# Patient Record
Sex: MALE | Race: BLACK OR AFRICAN AMERICAN | NOT HISPANIC OR LATINO | Employment: UNEMPLOYED | URBAN - METROPOLITAN AREA
[De-identification: names, ages, dates, MRNs, and addresses within clinical notes are randomized per-mention and may not be internally consistent; named-entity substitution may affect disease eponyms.]

---

## 2019-09-19 ENCOUNTER — HOSPITAL ENCOUNTER (INPATIENT)
Facility: OTHER | Age: 49
LOS: 5 days | Discharge: HOME | DRG: 194 | End: 2019-09-24
Attending: EMERGENCY MEDICINE | Admitting: EMERGENCY MEDICINE
Payer: MEDICAID

## 2019-09-19 DIAGNOSIS — R09.02 HYPOXIA: ICD-10-CM

## 2019-09-19 DIAGNOSIS — R00.0 TACHYCARDIA: ICD-10-CM

## 2019-09-19 DIAGNOSIS — J18.9 PNEUMONIA OF BOTH LOWER LOBES DUE TO INFECTIOUS ORGANISM: Primary | ICD-10-CM

## 2019-09-19 DIAGNOSIS — R06.02 SOB (SHORTNESS OF BREATH): ICD-10-CM

## 2019-09-19 DIAGNOSIS — R05.9 COUGH: ICD-10-CM

## 2019-09-19 DIAGNOSIS — D72.829 LEUKOCYTOSIS, UNSPECIFIED TYPE: ICD-10-CM

## 2019-09-19 PROBLEM — Z59.00 HOMELESSNESS: Status: ACTIVE | Noted: 2019-09-19

## 2019-09-19 PROBLEM — Z72.0 TOBACCO ABUSE: Status: ACTIVE | Noted: 2019-09-19

## 2019-09-19 PROBLEM — F19.10 POLYSUBSTANCE ABUSE: Chronic | Status: ACTIVE | Noted: 2019-09-19

## 2019-09-19 PROBLEM — F19.10 POLYSUBSTANCE ABUSE: Status: ACTIVE | Noted: 2019-09-19

## 2019-09-19 PROBLEM — D64.9 NORMOCYTIC ANEMIA: Status: ACTIVE | Noted: 2019-09-19

## 2019-09-19 PROBLEM — Z72.0 TOBACCO ABUSE: Chronic | Status: ACTIVE | Noted: 2019-09-19

## 2019-09-19 LAB
ALBUMIN SERPL BCP-MCNC: 3.7 G/DL (ref 3.5–5.2)
ALP SERPL-CCNC: 80 U/L (ref 55–135)
ALT SERPL W/O P-5'-P-CCNC: 25 U/L (ref 10–44)
AMPHET+METHAMPHET UR QL: NEGATIVE
ANION GAP SERPL CALC-SCNC: 10 MMOL/L (ref 8–16)
ANISOCYTOSIS BLD QL SMEAR: SLIGHT
AST SERPL-CCNC: 24 U/L (ref 10–40)
BARBITURATES UR QL SCN>200 NG/ML: NEGATIVE
BASOPHILS # BLD AUTO: ABNORMAL K/UL (ref 0–0.2)
BASOPHILS NFR BLD: 1 % (ref 0–1.9)
BENZODIAZ UR QL SCN>200 NG/ML: NEGATIVE
BILIRUB SERPL-MCNC: 0.5 MG/DL (ref 0.1–1)
BILIRUB UR QL STRIP: NEGATIVE
BNP SERPL-MCNC: <10 PG/ML (ref 0–99)
BUN SERPL-MCNC: 13 MG/DL (ref 6–20)
BZE UR QL SCN: NORMAL
CALCIUM SERPL-MCNC: 10.1 MG/DL (ref 8.7–10.5)
CANNABINOIDS UR QL SCN: NEGATIVE
CHLORIDE SERPL-SCNC: 101 MMOL/L (ref 95–110)
CLARITY UR: CLEAR
CO2 SERPL-SCNC: 29 MMOL/L (ref 23–29)
COLOR UR: YELLOW
CREAT SERPL-MCNC: 1 MG/DL (ref 0.5–1.4)
CREAT UR-MCNC: 39.4 MG/DL (ref 23–375)
DIFFERENTIAL METHOD: ABNORMAL
EOSINOPHIL # BLD AUTO: ABNORMAL K/UL (ref 0–0.5)
EOSINOPHIL NFR BLD: 0 % (ref 0–8)
ERYTHROCYTE [DISTWIDTH] IN BLOOD BY AUTOMATED COUNT: 15.3 % (ref 11.5–14.5)
EST. GFR  (AFRICAN AMERICAN): >60 ML/MIN/1.73 M^2
EST. GFR  (NON AFRICAN AMERICAN): >60 ML/MIN/1.73 M^2
ETHANOL UR-MCNC: <10 MG/DL
FERRITIN SERPL-MCNC: 397 NG/ML (ref 20–300)
FOLATE SERPL-MCNC: 8.3 NG/ML (ref 4–24)
GIANT PLATELETS BLD QL SMEAR: PRESENT
GLUCOSE SERPL-MCNC: 78 MG/DL (ref 70–110)
GLUCOSE UR QL STRIP: NEGATIVE
HCT VFR BLD AUTO: 37 % (ref 40–54)
HGB BLD-MCNC: 11.6 G/DL (ref 14–18)
HGB UR QL STRIP: NEGATIVE
IMM GRANULOCYTES # BLD AUTO: ABNORMAL K/UL (ref 0–0.04)
IMM GRANULOCYTES NFR BLD AUTO: ABNORMAL % (ref 0–0.5)
INFLUENZA A, MOLECULAR: NEGATIVE
INFLUENZA B, MOLECULAR: NEGATIVE
IRON SERPL-MCNC: <10 UG/DL (ref 45–160)
KETONES UR QL STRIP: NEGATIVE
LACTATE SERPL-SCNC: 1.1 MMOL/L (ref 0.5–2.2)
LACTATE SERPL-SCNC: 1.6 MMOL/L (ref 0.5–2.2)
LDH SERPL L TO P-CCNC: 746 U/L (ref 110–260)
LEUKOCYTE ESTERASE UR QL STRIP: NEGATIVE
LYMPHOCYTES # BLD AUTO: ABNORMAL K/UL (ref 1–4.8)
LYMPHOCYTES NFR BLD: 4 % (ref 18–48)
MCH RBC QN AUTO: 30.6 PG (ref 27–31)
MCHC RBC AUTO-ENTMCNC: 31.4 G/DL (ref 32–36)
MCV RBC AUTO: 98 FL (ref 82–98)
METAMYELOCYTES NFR BLD MANUAL: 4 %
METHADONE UR QL SCN>300 NG/ML: NEGATIVE
MONOCYTES # BLD AUTO: ABNORMAL K/UL (ref 0.3–1)
MONOCYTES NFR BLD: 3 % (ref 4–15)
MYELOCYTES NFR BLD MANUAL: 3 %
NEUTROPHILS # BLD AUTO: ABNORMAL K/UL (ref 1.8–7.7)
NEUTROPHILS NFR BLD: 83 % (ref 38–73)
NEUTS BAND NFR BLD MANUAL: 2 %
NITRITE UR QL STRIP: NEGATIVE
NRBC BLD-RTO: 3 /100 WBC
OPIATES UR QL SCN: NEGATIVE
PCP UR QL SCN>25 NG/ML: NEGATIVE
PH UR STRIP: 6 [PH] (ref 5–8)
PLATELET # BLD AUTO: 340 K/UL (ref 150–350)
PLATELET BLD QL SMEAR: ABNORMAL
PMV BLD AUTO: 11.8 FL (ref 9.2–12.9)
POCT GLUCOSE: 103 MG/DL (ref 70–110)
POIKILOCYTOSIS BLD QL SMEAR: SLIGHT
POLYCHROMASIA BLD QL SMEAR: ABNORMAL
POTASSIUM SERPL-SCNC: 3.5 MMOL/L (ref 3.5–5.1)
PROCALCITONIN SERPL IA-MCNC: 0.6 NG/ML
PROT SERPL-MCNC: 7 G/DL (ref 6–8.4)
PROT UR QL STRIP: NEGATIVE
RBC # BLD AUTO: 3.79 M/UL (ref 4.6–6.2)
RETICS/RBC NFR AUTO: 2.7 % (ref 0.4–2)
SATURATED IRON: ABNORMAL % (ref 20–50)
SMUDGE CELLS BLD QL SMEAR: PRESENT
SODIUM SERPL-SCNC: 140 MMOL/L (ref 136–145)
SP GR UR STRIP: <=1.005 (ref 1–1.03)
SPECIMEN SOURCE: NORMAL
TOTAL IRON BINDING CAPACITY: 240 UG/DL (ref 250–450)
TOXICOLOGY INFORMATION: NORMAL
TRANSFERRIN SERPL-MCNC: 162 MG/DL (ref 200–375)
TROPONIN I SERPL DL<=0.01 NG/ML-MCNC: 0.02 NG/ML (ref 0–0.03)
URN SPEC COLLECT METH UR: ABNORMAL
UROBILINOGEN UR STRIP-ACNC: NEGATIVE EU/DL
VIT B12 SERPL-MCNC: >2000 PG/ML (ref 210–950)
WBC # BLD AUTO: 70.49 K/UL (ref 3.9–12.7)
WBC TOXIC VACUOLES BLD QL SMEAR: PRESENT

## 2019-09-19 PROCEDURE — 99223 PR INITIAL HOSPITAL CARE,LEVL III: ICD-10-PCS | Mod: ,,, | Performed by: INTERNAL MEDICINE

## 2019-09-19 PROCEDURE — 85007 BL SMEAR W/DIFF WBC COUNT: CPT

## 2019-09-19 PROCEDURE — 82746 ASSAY OF FOLIC ACID SERUM: CPT

## 2019-09-19 PROCEDURE — 84484 ASSAY OF TROPONIN QUANT: CPT

## 2019-09-19 PROCEDURE — 86480 TB TEST CELL IMMUN MEASURE: CPT

## 2019-09-19 PROCEDURE — 85060 PATHOLOGIST REVIEW: ICD-10-PCS | Mod: ,,, | Performed by: PATHOLOGY

## 2019-09-19 PROCEDURE — 85045 AUTOMATED RETICULOCYTE COUNT: CPT

## 2019-09-19 PROCEDURE — 87206 SMEAR FLUORESCENT/ACID STAI: CPT

## 2019-09-19 PROCEDURE — 83605 ASSAY OF LACTIC ACID: CPT

## 2019-09-19 PROCEDURE — 83615 LACTATE (LD) (LDH) ENZYME: CPT

## 2019-09-19 PROCEDURE — 83540 ASSAY OF IRON: CPT

## 2019-09-19 PROCEDURE — 84145 PROCALCITONIN (PCT): CPT

## 2019-09-19 PROCEDURE — 85060 BLOOD SMEAR INTERPRETATION: CPT | Mod: ,,, | Performed by: PATHOLOGY

## 2019-09-19 PROCEDURE — 94761 N-INVAS EAR/PLS OXIMETRY MLT: CPT

## 2019-09-19 PROCEDURE — 93010 EKG 12-LEAD: ICD-10-PCS | Mod: 76,,, | Performed by: INTERNAL MEDICINE

## 2019-09-19 PROCEDURE — 87502 INFLUENZA DNA AMP PROBE: CPT

## 2019-09-19 PROCEDURE — 87015 SPECIMEN INFECT AGNT CONCNTJ: CPT

## 2019-09-19 PROCEDURE — 94640 AIRWAY INHALATION TREATMENT: CPT

## 2019-09-19 PROCEDURE — 80053 COMPREHEN METABOLIC PANEL: CPT

## 2019-09-19 PROCEDURE — 63600175 PHARM REV CODE 636 W HCPCS: Performed by: INTERNAL MEDICINE

## 2019-09-19 PROCEDURE — 96365 THER/PROPH/DIAG IV INF INIT: CPT

## 2019-09-19 PROCEDURE — 81003 URINALYSIS AUTO W/O SCOPE: CPT

## 2019-09-19 PROCEDURE — 11000001 HC ACUTE MED/SURG PRIVATE ROOM

## 2019-09-19 PROCEDURE — 82728 ASSAY OF FERRITIN: CPT

## 2019-09-19 PROCEDURE — 25000003 PHARM REV CODE 250: Performed by: INTERNAL MEDICINE

## 2019-09-19 PROCEDURE — 87116 MYCOBACTERIA CULTURE: CPT

## 2019-09-19 PROCEDURE — 80307 DRUG TEST PRSMV CHEM ANLYZR: CPT

## 2019-09-19 PROCEDURE — 99285 EMERGENCY DEPT VISIT HI MDM: CPT | Mod: 25

## 2019-09-19 PROCEDURE — 63600175 PHARM REV CODE 636 W HCPCS: Performed by: EMERGENCY MEDICINE

## 2019-09-19 PROCEDURE — 82607 VITAMIN B-12: CPT

## 2019-09-19 PROCEDURE — 93005 ELECTROCARDIOGRAM TRACING: CPT

## 2019-09-19 PROCEDURE — 96366 THER/PROPH/DIAG IV INF ADDON: CPT

## 2019-09-19 PROCEDURE — 86703 HIV-1/HIV-2 1 RESULT ANTBDY: CPT

## 2019-09-19 PROCEDURE — 87040 BLOOD CULTURE FOR BACTERIA: CPT

## 2019-09-19 PROCEDURE — 85027 COMPLETE CBC AUTOMATED: CPT

## 2019-09-19 PROCEDURE — 25000003 PHARM REV CODE 250: Performed by: EMERGENCY MEDICINE

## 2019-09-19 PROCEDURE — 36415 COLL VENOUS BLD VENIPUNCTURE: CPT

## 2019-09-19 PROCEDURE — 96367 TX/PROPH/DG ADDL SEQ IV INF: CPT

## 2019-09-19 PROCEDURE — 83880 ASSAY OF NATRIURETIC PEPTIDE: CPT

## 2019-09-19 PROCEDURE — 99223 1ST HOSP IP/OBS HIGH 75: CPT | Mod: ,,, | Performed by: INTERNAL MEDICINE

## 2019-09-19 PROCEDURE — 25000242 PHARM REV CODE 250 ALT 637 W/ HCPCS: Performed by: EMERGENCY MEDICINE

## 2019-09-19 PROCEDURE — 93010 ELECTROCARDIOGRAM REPORT: CPT | Mod: 76,,, | Performed by: INTERNAL MEDICINE

## 2019-09-19 RX ORDER — ALBUTEROL SULFATE 0.83 MG/ML
2.5 SOLUTION RESPIRATORY (INHALATION)
Status: COMPLETED | OUTPATIENT
Start: 2019-09-19 | End: 2019-09-19

## 2019-09-19 RX ORDER — ACETAMINOPHEN 500 MG
1000 TABLET ORAL
Status: COMPLETED | OUTPATIENT
Start: 2019-09-19 | End: 2019-09-19

## 2019-09-19 RX ORDER — SODIUM CHLORIDE 0.9 % (FLUSH) 0.9 %
10 SYRINGE (ML) INJECTION
Status: CANCELLED | OUTPATIENT
Start: 2019-09-19

## 2019-09-19 RX ORDER — ENOXAPARIN SODIUM 100 MG/ML
40 INJECTION SUBCUTANEOUS EVERY 24 HOURS
Status: DISCONTINUED | OUTPATIENT
Start: 2019-09-19 | End: 2019-09-24 | Stop reason: HOSPADM

## 2019-09-19 RX ORDER — SODIUM CHLORIDE 0.9 % (FLUSH) 0.9 %
5 SYRINGE (ML) INJECTION
Status: DISCONTINUED | OUTPATIENT
Start: 2019-09-19 | End: 2019-09-24 | Stop reason: HOSPADM

## 2019-09-19 RX ORDER — ACETAMINOPHEN 325 MG/1
650 TABLET ORAL EVERY 6 HOURS PRN
Status: DISCONTINUED | OUTPATIENT
Start: 2019-09-19 | End: 2019-09-24 | Stop reason: HOSPADM

## 2019-09-19 RX ADMIN — SODIUM CHLORIDE 1974 ML: 0.9 INJECTION, SOLUTION INTRAVENOUS at 01:09

## 2019-09-19 RX ADMIN — AZITHROMYCIN MONOHYDRATE 500 MG: 500 INJECTION, POWDER, LYOPHILIZED, FOR SOLUTION INTRAVENOUS at 01:09

## 2019-09-19 RX ADMIN — ALBUTEROL SULFATE 2.5 MG: 2.5 SOLUTION RESPIRATORY (INHALATION) at 10:09

## 2019-09-19 RX ADMIN — CEFTRIAXONE 1 G: 1 INJECTION, SOLUTION INTRAVENOUS at 01:09

## 2019-09-19 RX ADMIN — ENOXAPARIN SODIUM 40 MG: 100 INJECTION SUBCUTANEOUS at 06:09

## 2019-09-19 RX ADMIN — ACETAMINOPHEN 1000 MG: 500 TABLET ORAL at 10:09

## 2019-09-19 RX ADMIN — ACETAMINOPHEN 650 MG: 325 TABLET, FILM COATED ORAL at 06:09

## 2019-09-19 NOTE — H&P
"[facility] Medicine  History & Physical    Patient Name: Gustabo Callejas  MRN: [id number]  Admission Date: 9/19/2019  Attending Physician: SOY Ma MD  Primary Care Provider: Primary Doctor No     Patient information was obtained from patient, past medical records and ER records.     Subjective:     Principal Problem:Pneumonia of both lower lobes due to infectious organism    Chief Complaint:   Chief Complaint   Patient presents with    Fever     pt came from [facility] with c/o fever and cough, reported temp of 103.         HPI: Mr. Callejas is a 49/M with pertinent history of homelessness, polysubstance abuse who presented to ED 09/19 with a several day history of fatigue, fever, chills, and shortness of breath. He reports that he has been feeling intermittently warm but has not measured his temperature; has also noted feeling "wheezy" and "like I'm not breathing properly." He does not recall specific night sweats other than a recent night spent at the [facility], where he had night sweats, but felt that the temperature was just elevated in the building. He states that he was attempting to be admitted to [facility] to enroll in rehabilitation but had significant fever prior and subsequently presented to the hospital.    He denies a personal history of tuberculosis or HIV, but does report long-term homelessness and prior imprisonments. He reported that he had been given a TB skin test and was due to be read Wednesday, but had not been officially read. Past sexual partners include men and women, but with only one partner in the last six months. He is unsure of any recent weight loss. He reports smoking 8-10 cigarettes a day "when I can get them," marijuana "when I can get it," and cocaine "as often as I can, most days." He states he drinks, but irregularly, with one to two beers per occasion.    History reviewed. No pertinent past medical history.    Past Surgical " History:   Procedure Laterality Date    APPENDECTOMY       Review of patient's allergies indicates:   Allergen Reactions    Asa [aspirin] Swelling     No current facility-administered medications on file prior to encounter.      No current outpatient medications on file prior to encounter.     Family History     Problem Relation (Age of Onset)    Breast cancer Mother    Cancer Sister        Tobacco Use    Smoking status: Current Some Day Smoker     Packs/day: 0.50   Substance and Sexual Activity    Alcohol use: Yes     Drinks per session: 1 or 2    Drug use: Yes     Types: Cocaine, Marijuana    Sexual activity: Yes     Partners: Female, Male     Review of Systems   Constitutional: Positive for chills, diaphoresis, fatigue and fever.   HENT: Negative for sore throat and trouble swallowing.    Eyes: Negative for pain and visual disturbance.   Respiratory: Positive for cough and shortness of breath.    Cardiovascular: Negative for chest pain and palpitations.   Gastrointestinal: Negative for abdominal pain, nausea and vomiting.   Genitourinary: Negative for difficulty urinating and dysuria.   Musculoskeletal: Negative for arthralgias and joint swelling.   Skin: Negative for rash and wound.   Neurological: Negative for weakness and numbness.     Objective:     Vital Signs (Most Recent):  Temp: (!) 100.6 °F (38.1 °C) (09/19/19 1325)  Pulse: 92 (09/19/19 1406)  Resp: 19 (09/19/19 1403)  BP: 103/63 (09/19/19 1406)  SpO2: 95 % (09/19/19 1406) Vital Signs (24h Range):  Temp:  [100.4 °F (38 °C)-101.6 °F (38.7 °C)] 100.6 °F (38.1 °C)  Pulse:  [] 92  Resp:  [18-22] 19  SpO2:  [92 %-98 %] 95 %  BP: (103-114)/(55-63) 103/63     Weight: 65.8 kg (145 lb)  Body mass index is 23.4 kg/m².    Physical Exam   Constitutional: He is oriented to person, place, and time. He appears well-developed. No distress.   HENT:   Head: Normocephalic and atraumatic.   Eyes: Conjunctivae and EOM are normal.   Cardiovascular: Normal rate and  intact distal pulses.   Pulmonary/Chest: Effort normal. No respiratory distress.   Bibasilar crackles.   Abdominal: Soft. There is no tenderness.   Musculoskeletal: Normal range of motion. He exhibits no edema.   Neurological: He is alert and oriented to person, place, and time.   Skin: Skin is warm and dry.   Nursing note and vitals reviewed.     Significant Labs:   CBC:  Recent Labs   Lab 09/19/19  1019   WBC 70.49*   HGB 11.6*   HCT 37.0*      GRAN 83.0*  Test Not Performed   LYMPH 4.0*  Test Not Performed   MONO 3.0*  Test Not Performed   EOS Test Not Performed   BASO Test Not Performed      CMP:  Recent Labs   Lab 09/19/19  1019      K 3.5      CO2 29   BUN 13   CREATININE 1.0   GLU 78   CALCIUM 10.1   ALKPHOS 80   AST 24   ALT 25   BILITOT 0.5   PROT 7.0   ALBUMIN 3.7   ANIONGAP 10     No results for input(s): PROCAL, LACTATE in the last 168 hours.     Significant Imaging:   CXR 09/19/19:  There is focal airspace opacification in the superior segment of the right lower lobe as well as the left lower lobe. The cardiomediastinal silhouette is normal. The osseous and soft tissue structures are normal.    Assessment/Plan:     * Pneumonia of both lower lobes due to infectious organism  - Presumed infectious etiology of bilateral lower lobe abnormalities noted on CXR and greatly elevated WBCs. Differentials: viral, bacterial, risk factors for TB, HIV as well, polysubstance abuse with cocaine, marijuana, tobacco (all smoked).  - WBCs to 70s without prior labs for comparison; diff with mostly neutrophils, but some bands, myelocytes and metamyelocytes as well.  - Attests to recent TB skin test but never read. Check TB quantiferon gold, AFB respiratory culture x 3.  - Continue coverage for CAP started in ED: ceftriaxone 1g IV q24hr, azithromycin 500mg IV q24hr.  - Lactic 1.6; procal pending. Check HIV 1/2.  - Consult pulmonology for further evaluation and management recommendations.    Normocytic  anemia  - Normocytic anemia with no prior labs for comparison.  - Check B12, folate, iron studies, retic count.    Homelessness  - Social work consult for discharge planning.    Polysubstance abuse  - Polysubstance abuse with tobacco, marijuana, and cocaine.  - Cessation counseling; is interested in cessation and was attempting to enter [facility] for detox.    Tobacco abuse  - Cessation counseling.  - Declines nicotine patch.    VTE Risk Mitigation (From admission, onward)    None             SOY Ma MD  Department of Hospital Medicine   [facility]

## 2019-09-19 NOTE — ED NOTES
Appearance: Pt awake, alert & oriented to person, place & time. Pt in no acute distress at present time. Pt is clean and well groomed with clothes appropriately fastened. + reports of HA.   Skin: Skin warm, dry & intact. Color consistent with ethnicity. Mucous membranes dry. No breakdown or brusing noted.   Musculoskeletal: Patient moving all extremities well, no obvious swelling or deformities noted.   Respiratory: Respirations spontaneous, even, and non-labored. Visible chest rise noted. Airway is open and patent. No accessory muscle use noted. Frequent coughing noted.  Neurologic: Sensation is intact. Speech is clear and appropriate. Eyes open spontaneously, behavior appropriate to situation, follows commands, facial expression symmetrical, bilateral hand grasp equal and even, purposeful motor response noted.  Cardiac: All peripheral pulses present. No Bilateral lower extremity edema. Cap refill is <3 seconds. Pt denies active chest pains, dizziness, blurred vision, weakness or fatigue at this time. Mild SOB reported although RR even and unlabored.   Abdomen:  Pt denies active abd pains, cramping or discomfort, No N/V/D at this time.   : Pt reports no dysuria or hematuria.

## 2019-09-19 NOTE — PLAN OF CARE
Problem: Infection  Goal: Infection Symptom Resolution  Outcome: Ongoing (interventions implemented as appropriate)  Patient arrived via stretcher no acute distress,with mask in place.sera AC IV s/l .Airborne isolation started.denies pain non productive cough noted .will cont to assess.

## 2019-09-19 NOTE — ED NOTES
Airborne precautions initiated. Placed sx mask on pt at this time. Pt AAOx4 and appropriate at this time. Respirations even and unlabored. No acute distress noted.

## 2019-09-19 NOTE — ASSESSMENT & PLAN NOTE
- Presumed infectious etiology of bilateral lower lobe abnormalities noted on CXR and greatly elevated WBCs. Differentials: viral, bacterial, risk factors for TB, HIV as well, polysubstance abuse with cocaine, marijuana, tobacco (all smoked).  - WBCs to 70s without prior labs for comparison; diff with mostly neutrophils, but some bands, myelocytes and metamyelocytes as well.  - TB quantiferon gold pending, AFB respiratory culture x 3 being collected.  - Continue coverage for CAP with ceftriaxone 1g IV q24hr, azithromycin 500mg IV q24hr.  - Lactic 1.6; procal 0.6. HIV 1/2 pending.  - Appreciate pulmonology assistance.

## 2019-09-19 NOTE — ED NOTES
"Pt to ED via EMS, c/o fever, chills, states "eddie been shaking." Admits to smoking cocaine yesterday; was at [facility] for drug detox they told him he had a fever and called EMS. Pt c/o dry cough. Denies CP, nausea or vomiting at this time. + reports of SOB pt with no WOB. Pt AAOx4 and appropriate at this time. Respirations even and unlabored. No acute distress noted. Pt updated on POC. Bed is locked and in lowest position with side rails up x2. Call bell within reach and pt oriented to use of call bell. Pt on continuous cardiac monitoring, continuous pulse ox, and continuous BP cuff. Will continue to monitor.     "

## 2019-09-19 NOTE — ASSESSMENT & PLAN NOTE
- Normocytic anemia with no prior labs for comparison.  - Check B12, folate, iron studies, retic count.

## 2019-09-19 NOTE — ASSESSMENT & PLAN NOTE
- Polysubstance abuse with tobacco, marijuana, and cocaine.  - Cessation counseling; is interested in cessation and was attempting to enter [facility] for detox.

## 2019-09-19 NOTE — HPI
"Mr. Callejas is a 49/M with pertinent history of homelessness, polysubstance abuse who presented to ED 09/19 with a several day history of fatigue, fever, chills, and shortness of breath. He reports that he has been feeling intermittently warm but has not measured his temperature; has also noted feeling "wheezy" and "like I'm not breathing properly." He does not recall specific night sweats other than a recent night spent at the [facility], where he had night sweats, but felt that the temperature was just elevated in the building. He states that he was attempting to be admitted to [facility] to enroll in rehabilitation but had significant fever prior and subsequently presented to the hospital.    He denies a personal history of tuberculosis or HIV, but does report long-term homelessness and prior imprisonments. He reported that he had been given a TB skin test and was due to be read Wednesday, but had not been officially read. Past sexual partners include men and women, but with only one partner in the last six months. He is unsure of any recent weight loss. He reports smoking 8-10 cigarettes a day "when I can get them," marijuana "when I can get it," and cocaine "as often as I can, most days." He states he drinks, but irregularly, with one to two beers per occasion.  "

## 2019-09-19 NOTE — ED PROVIDER NOTES
Encounter Date: 9/19/2019    SCRIBE #1 NOTE: I, Abbi Thompson, am scribing for, and in the presence of, Dr. Davalos.       History     Chief Complaint   Patient presents with    Fever     pt came from [facility] with c/o fever and cough, reported temp of 103.      Time seen by provider: 10:08 AM    This is a 49 y.o. male who presents with complaint of fever and chills that began yesterday. Patient was at [facility] for drug detox with a reported temperature of 103. He reports dry cough, but denies nausea, vomiting, SOB, chest pain, headache, dizziness, light headedness, or weakness. Patient is homeless, and is unsure of sick contacts. He denies past medical history. He admits to use of cocaine, tobacco, and alcohol.    The history is provided by the patient.     Review of patient's allergies indicates:   Allergen Reactions    Asa [aspirin] Swelling     History reviewed. No pertinent past medical history.  Past Surgical History:   Procedure Laterality Date    APPENDECTOMY       Family History   Problem Relation Age of Onset    Breast cancer Mother     Cancer Sister      Social History     Tobacco Use    Smoking status: Current Some Day Smoker     Packs/day: 0.50   Substance Use Topics    Alcohol use: Yes     Drinks per session: 1 or 2    Drug use: Yes     Types: Cocaine, Marijuana     Review of Systems   Constitutional: Positive for chills and fever.   HENT: Negative for congestion and sore throat.    Eyes: Negative for photophobia and redness.   Respiratory: Positive for cough. Negative for shortness of breath.    Cardiovascular: Negative for chest pain.   Gastrointestinal: Negative for abdominal pain, nausea and vomiting.   Genitourinary: Negative for dysuria.   Musculoskeletal: Negative for back pain.   Skin: Negative for rash.   Neurological: Negative for dizziness, weakness, light-headedness and headaches.   Psychiatric/Behavioral: Negative for confusion.       Physical Exam     Initial Vitals  [09/19/19 0922]   BP Pulse Resp Temp SpO2   (!) 109/57 100 20 (!) 100.4 °F (38 °C) (!) 92 %      MAP       --         Physical Exam    Nursing note and vitals reviewed.  Constitutional: He appears well-developed and well-nourished. He is not diaphoretic. No distress.   Temperature of 102 on exam.   HENT:   Head: Normocephalic and atraumatic.   Mouth/Throat: Oropharynx is clear and moist.   Eyes: Conjunctivae and EOM are normal. Pupils are equal, round, and reactive to light.   Neck: Normal range of motion. Neck supple.   Cardiovascular: Normal rate, regular rhythm, S1 normal, S2 normal and normal heart sounds. Exam reveals no gallop and no friction rub.    No murmur heard.  Pulmonary/Chest: Breath sounds normal. No respiratory distress. He has no wheezes. He has no rhonchi. He has no rales.   Abdominal: Soft. Bowel sounds are normal. There is no tenderness. There is no rebound and no guarding.   Musculoskeletal: Normal range of motion. He exhibits no edema or tenderness.   No lower extremity edema.    Lymphadenopathy:     He has no cervical adenopathy.   Neurological: He is alert and oriented to person, place, and time.   Skin: Skin is warm and dry. Capillary refill takes less than 2 seconds. No rash noted. No pallor.   Psychiatric: He has a normal mood and affect. His behavior is normal. Judgment and thought content normal.         ED Course   Procedures  Labs Reviewed   CBC W/ AUTO DIFFERENTIAL - Abnormal; Notable for the following components:       Result Value    WBC 70.49 (*)     RBC 3.79 (*)     Hemoglobin 11.6 (*)     Hematocrit 37.0 (*)     Mean Corpuscular Hemoglobin Conc 31.4 (*)     RDW 15.3 (*)     nRBC 3 (*)     Gran% 83.0 (*)     Lymph% 4.0 (*)     Mono% 3.0 (*)     All other components within normal limits    Narrative:       WBCIR critical result(s) called and verbal readback obtained from   Sergey Turner RN @ED 11:15, 09/19/2019 12:01   URINALYSIS, REFLEX TO URINE CULTURE - Abnormal; Notable for the  following components:    Specific Gravity, UA <=1.005 (*)     All other components within normal limits    Narrative:     Preferred Collection Type->Urine, Clean Catch   PROCALCITONIN - Abnormal; Notable for the following components:    Procalcitonin 0.60 (*)     All other components within normal limits   RETICULOCYTES - Abnormal; Notable for the following components:    Retic 2.7 (*)     All other components within normal limits   LACTATE DEHYDROGENASE - Abnormal; Notable for the following components:     (*)     All other components within normal limits   INFLUENZA A & B BY MOLECULAR   COMPREHENSIVE METABOLIC PANEL   B-TYPE NATRIURETIC PEPTIDE   TROPONIN I   LACTIC ACID, PLASMA   TOXICOLOGY SCREEN, URINE, RANDOM (COMPLIANCE)    Narrative:     Preferred Collection Type->Urine, Clean Catch   QUANTIFERON GOLD TB     EKG Readings: (Independently Interpreted)   Initial Reading: No STEMI.   Normal sinus rhythm. No ischemic changes. Ventricular rate of 91 bpm. No acute ST/T wave changes. No T wave inversions.     ECG Results          EKG 12-lead (Final result)  Result time 09/20/19 15:44:39    Final result by Interface, Lab In [facility] (09/20/19 15:44:39)                 Narrative:    Test Reason : R00.0,    Vent. Rate : 091 BPM     Atrial Rate : 091 BPM     P-R Int : 168 ms          QRS Dur : 076 ms      QT Int : 352 ms       P-R-T Axes : 067 050 069 degrees     QTc Int : 432 ms    Normal sinus rhythm  Normal ECG    Confirmed by Kvng aMtt MD ([id number]) on 9/20/2019 3:44:29 PM    Referred By: [email or web address]   SELF           Confirmed By:Kvng Matt MD                             EKG 12-lead (Final result)  Result time 09/20/19 15:39:03    Final result by Interface, Lab In [facility] (09/20/19 15:39:03)                 Narrative:    Test Reason : R06.02,    Vent. Rate : 091 BPM     Atrial Rate : 091 BPM     P-R Int : 158 ms          QRS Dur : 080 ms      QT Int : 334 ms       P-R-T Axes : 078 067 076 degrees      QTc Int : 410 ms    Normal sinus rhythm  Normal ECG    Confirmed by Kvng Matt MD ([id number]) on 9/20/2019 3:38:56 PM    Referred By: [email or web address]   SELF           Confirmed By:Kvng Matt MD                             EKG 12-LEAD (Final result)  Result time 09/20/19 15:27:28    Final result by Unknown User (09/20/19 15:27:28)                                Imaging Results          X-Ray Chest PA And Lateral (Final result)  Result time 09/19/19 11:21:27    Final result by Indira Garcia MD (09/19/19 11:21:27)                 Impression:      Findings concerning for bilateral lower lobe pneumonia.  Follow-up radiographs are recommended in 6-8 weeks to document resolution.      Electronically signed by: Indira Garcia MD  Date:    09/19/2019  Time:    11:21             Narrative:    EXAMINATION:  XR CHEST PA AND LATERAL    CLINICAL HISTORY:  Cough    TECHNIQUE:  PA and lateral views of the chest were performed.    COMPARISON:  None    FINDINGS:  There is focal airspace opacification in the superior segment of the right lower lobe as well as the left lower lobe.    The cardiomediastinal silhouette is normal.    The osseous and soft tissue structures are normal.                              X-Rays:   Independently Interpreted Readings:   Chest X-Ray: Bilateral lower lobe pneumonia.     Medical Decision Making:   History:   Old Medical Records: I decided to obtain old medical records.  Independently Interpreted Test(s):   I have ordered and independently interpreted X-rays - see prior notes.  I have ordered and independently interpreted EKG Reading(s) - see prior notes  Clinical Tests:   Lab Tests: Ordered and Reviewed  Radiological Study: Ordered and Reviewed  Medical Tests: Ordered and Reviewed            Scribe Attestation:   Scribe #1: I performed the above scribed service and the documentation accurately describes the services I performed. I attest to the accuracy of the note.    Attending  Attestation:           Physician Attestation for Scribe:  Physician Attestation Statement for Scribe #1: I, Dr. Mendoza, reviewed documentation, as scribed by Abbi Thompson in my presence, and it is both accurate and complete.         Attending ED Notes:   50 y/o male with fever, chills and dry cough. Blood cultures are drawn, IV antibiotics are administered. IV fluids are administered. Lactic acid is 1.6. CXR reveals bilateral pneumonia. No elevation in BNP. The patient is extensively counseled on there diagnosis and treatment including all diagnostic, laboratory and physical exam findings. The patient is admitted in stable condition.            ED Course as of Sep 22 1120   Thu Sep 19, 2019   1305 Consulted and discussed with Dr. Goodwin, who will admit the patient to Dr. Ma.      [LT]      ED Course User Index  [LT] Abbi Thompson     Clinical Impression:     1. Pneumonia of both lower lobes due to infectious organism    2. SOB (shortness of breath)    3. Cough    4. Hypoxia    5. Tachycardia                                 Gene Mendoza MD  09/22/19 1130

## 2019-09-19 NOTE — ASSESSMENT & PLAN NOTE
- Presumed infectious etiology of bilateral lower lobe abnormalities noted on CXR and greatly elevated WBCs. Differentials: viral, bacterial, risk factors for TB, HIV as well, polysubstance abuse with cocaine, marijuana, tobacco (all smoked).  - WBCs to 70s without prior labs for comparison; diff with mostly neutrophils, but some bands, myelocytes and metamyelocytes as well.  - Attests to recent TB skin test but never read. Check TB quantiferon gold, AFB respiratory culture x 3.  - Continue coverage for CAP started in ED: ceftriaxone 1g IV q24hr, azithromycin 500mg IV q24hr.  - Lactic 1.6; procal pending.  - Consult pulmonology for further evaluation and management recommendations.

## 2019-09-19 NOTE — SUBJECTIVE & OBJECTIVE
History reviewed. No pertinent past medical history.    Past Surgical History:   Procedure Laterality Date    APPENDECTOMY       Review of patient's allergies indicates:   Allergen Reactions    Asa [aspirin] Swelling     No current facility-administered medications on file prior to encounter.      No current outpatient medications on file prior to encounter.     Family History     Problem Relation (Age of Onset)    Breast cancer Mother    Cancer Sister        Tobacco Use    Smoking status: Current Some Day Smoker     Packs/day: 0.50   Substance and Sexual Activity    Alcohol use: Yes     Drinks per session: 1 or 2    Drug use: Yes     Types: Cocaine, Marijuana    Sexual activity: Yes     Partners: Female, Male     Review of Systems   Constitutional: Positive for chills, diaphoresis, fatigue and fever.   HENT: Negative for sore throat and trouble swallowing.    Eyes: Negative for pain and visual disturbance.   Respiratory: Positive for cough and shortness of breath.    Cardiovascular: Negative for chest pain and palpitations.   Gastrointestinal: Negative for abdominal pain, nausea and vomiting.   Genitourinary: Negative for difficulty urinating and dysuria.   Musculoskeletal: Negative for arthralgias and joint swelling.   Skin: Negative for rash and wound.   Neurological: Negative for weakness and numbness.     Objective:     Vital Signs (Most Recent):  Temp: (!) 100.6 °F (38.1 °C) (09/19/19 1325)  Pulse: 92 (09/19/19 1406)  Resp: 19 (09/19/19 1403)  BP: 103/63 (09/19/19 1406)  SpO2: 95 % (09/19/19 1406) Vital Signs (24h Range):  Temp:  [100.4 °F (38 °C)-101.6 °F (38.7 °C)] 100.6 °F (38.1 °C)  Pulse:  [] 92  Resp:  [18-22] 19  SpO2:  [92 %-98 %] 95 %  BP: (103-114)/(55-63) 103/63     Weight: 65.8 kg (145 lb)  Body mass index is 23.4 kg/m².    Physical Exam   Constitutional: He is oriented to person, place, and time. He appears well-developed. No distress.   HENT:   Head: Normocephalic and atraumatic.    Eyes: Conjunctivae and EOM are normal.   Cardiovascular: Normal rate and intact distal pulses.   Pulmonary/Chest: Effort normal. No respiratory distress.   Bibasilar crackles.   Abdominal: Soft. There is no tenderness.   Musculoskeletal: Normal range of motion. He exhibits no edema.   Neurological: He is alert and oriented to person, place, and time.   Skin: Skin is warm and dry.   Nursing note and vitals reviewed.     Significant Labs:   CBC:  Recent Labs   Lab 09/19/19  1019   WBC 70.49*   HGB 11.6*   HCT 37.0*      GRAN 83.0*  Test Not Performed   LYMPH 4.0*  Test Not Performed   MONO 3.0*  Test Not Performed   EOS Test Not Performed   BASO Test Not Performed      CMP:  Recent Labs   Lab 09/19/19  1019      K 3.5      CO2 29   BUN 13   CREATININE 1.0   GLU 78   CALCIUM 10.1   ALKPHOS 80   AST 24   ALT 25   BILITOT 0.5   PROT 7.0   ALBUMIN 3.7   ANIONGAP 10     No results for input(s): PROCAL, LACTATE in the last 168 hours.     Significant Imaging:   CXR 09/19/19:  There is focal airspace opacification in the superior segment of the right lower lobe as well as the left lower lobe. The cardiomediastinal silhouette is normal. The osseous and soft tissue structures are normal.

## 2019-09-20 LAB
ANION GAP SERPL CALC-SCNC: 9 MMOL/L (ref 8–16)
ANISOCYTOSIS BLD QL SMEAR: SLIGHT
BASOPHILS # BLD AUTO: ABNORMAL K/UL (ref 0–0.2)
BASOPHILS NFR BLD: 4 % (ref 0–1.9)
BUN SERPL-MCNC: 10 MG/DL (ref 6–20)
CALCIUM SERPL-MCNC: 8.8 MG/DL (ref 8.7–10.5)
CHLORIDE SERPL-SCNC: 106 MMOL/L (ref 95–110)
CO2 SERPL-SCNC: 25 MMOL/L (ref 23–29)
CREAT SERPL-MCNC: 0.8 MG/DL (ref 0.5–1.4)
DIFFERENTIAL METHOD: ABNORMAL
EOSINOPHIL # BLD AUTO: ABNORMAL K/UL (ref 0–0.5)
EOSINOPHIL NFR BLD: 1 % (ref 0–8)
ERYTHROCYTE [DISTWIDTH] IN BLOOD BY AUTOMATED COUNT: 15.1 % (ref 11.5–14.5)
EST. GFR  (AFRICAN AMERICAN): >60 ML/MIN/1.73 M^2
EST. GFR  (NON AFRICAN AMERICAN): >60 ML/MIN/1.73 M^2
GLUCOSE SERPL-MCNC: 70 MG/DL (ref 70–110)
HCT VFR BLD AUTO: 35.9 % (ref 40–54)
HGB BLD-MCNC: 11.4 G/DL (ref 14–18)
HIV 1+2 AB+HIV1 P24 AG SERPL QL IA: NEGATIVE
HYPOCHROMIA BLD QL SMEAR: ABNORMAL
IMM GRANULOCYTES # BLD AUTO: ABNORMAL K/UL (ref 0–0.04)
IMM GRANULOCYTES NFR BLD AUTO: ABNORMAL % (ref 0–0.5)
LYMPHOCYTES # BLD AUTO: ABNORMAL K/UL (ref 1–4.8)
LYMPHOCYTES NFR BLD: 6 % (ref 18–48)
MAGNESIUM SERPL-MCNC: 2.1 MG/DL (ref 1.6–2.6)
MCH RBC QN AUTO: 30.6 PG (ref 27–31)
MCHC RBC AUTO-ENTMCNC: 31.8 G/DL (ref 32–36)
MCV RBC AUTO: 97 FL (ref 82–98)
METAMYELOCYTES NFR BLD MANUAL: 6 %
MONOCYTES # BLD AUTO: ABNORMAL K/UL (ref 0.3–1)
MONOCYTES NFR BLD: 5 % (ref 4–15)
MYELOCYTES NFR BLD MANUAL: 5 %
NEUTROPHILS NFR BLD: 70 % (ref 38–73)
NEUTS BAND NFR BLD MANUAL: 3 %
NRBC BLD-RTO: 2 /100 WBC
PHOSPHATE SERPL-MCNC: 3.2 MG/DL (ref 2.7–4.5)
PLATELET # BLD AUTO: 403 K/UL (ref 150–350)
PMV BLD AUTO: 10.9 FL (ref 9.2–12.9)
POLYCHROMASIA BLD QL SMEAR: ABNORMAL
POTASSIUM SERPL-SCNC: 3.7 MMOL/L (ref 3.5–5.1)
RBC # BLD AUTO: 3.72 M/UL (ref 4.6–6.2)
SODIUM SERPL-SCNC: 140 MMOL/L (ref 136–145)
WBC # BLD AUTO: 57.85 K/UL (ref 3.9–12.7)

## 2019-09-20 PROCEDURE — 87116 MYCOBACTERIA CULTURE: CPT | Mod: 59

## 2019-09-20 PROCEDURE — S0030 INJECTION, METRONIDAZOLE: HCPCS | Performed by: INTERNAL MEDICINE

## 2019-09-20 PROCEDURE — 85007 BL SMEAR W/DIFF WBC COUNT: CPT

## 2019-09-20 PROCEDURE — 87015 SPECIMEN INFECT AGNT CONCNTJ: CPT | Mod: 59

## 2019-09-20 PROCEDURE — 11000001 HC ACUTE MED/SURG PRIVATE ROOM

## 2019-09-20 PROCEDURE — 25000003 PHARM REV CODE 250: Performed by: INTERNAL MEDICINE

## 2019-09-20 PROCEDURE — 25500020 PHARM REV CODE 255: Performed by: INTERNAL MEDICINE

## 2019-09-20 PROCEDURE — 99233 PR SUBSEQUENT HOSPITAL CARE,LEVL III: ICD-10-PCS | Mod: ,,, | Performed by: EMERGENCY MEDICINE

## 2019-09-20 PROCEDURE — 99233 PR SUBSEQUENT HOSPITAL CARE,LEVL III: ICD-10-PCS | Mod: ,,, | Performed by: INTERNAL MEDICINE

## 2019-09-20 PROCEDURE — 99233 SBSQ HOSP IP/OBS HIGH 50: CPT | Mod: ,,, | Performed by: EMERGENCY MEDICINE

## 2019-09-20 PROCEDURE — 36415 COLL VENOUS BLD VENIPUNCTURE: CPT

## 2019-09-20 PROCEDURE — 99233 SBSQ HOSP IP/OBS HIGH 50: CPT | Mod: ,,, | Performed by: INTERNAL MEDICINE

## 2019-09-20 PROCEDURE — 94761 N-INVAS EAR/PLS OXIMETRY MLT: CPT

## 2019-09-20 PROCEDURE — 80048 BASIC METABOLIC PNL TOTAL CA: CPT

## 2019-09-20 PROCEDURE — 87206 SMEAR FLUORESCENT/ACID STAI: CPT | Mod: 91

## 2019-09-20 PROCEDURE — 84100 ASSAY OF PHOSPHORUS: CPT

## 2019-09-20 PROCEDURE — 83735 ASSAY OF MAGNESIUM: CPT

## 2019-09-20 PROCEDURE — 99900035 HC TECH TIME PER 15 MIN (STAT)

## 2019-09-20 PROCEDURE — 85027 COMPLETE CBC AUTOMATED: CPT

## 2019-09-20 PROCEDURE — 87632 RESP VIRUS 6-11 TARGETS: CPT

## 2019-09-20 PROCEDURE — 63600175 PHARM REV CODE 636 W HCPCS: Performed by: INTERNAL MEDICINE

## 2019-09-20 RX ORDER — METRONIDAZOLE 500 MG/100ML
500 INJECTION, SOLUTION INTRAVENOUS
Status: COMPLETED | OUTPATIENT
Start: 2019-09-20 | End: 2019-09-23

## 2019-09-20 RX ADMIN — AZITHROMYCIN MONOHYDRATE 500 MG: 500 INJECTION, POWDER, LYOPHILIZED, FOR SOLUTION INTRAVENOUS at 02:09

## 2019-09-20 RX ADMIN — ENOXAPARIN SODIUM 40 MG: 100 INJECTION SUBCUTANEOUS at 05:09

## 2019-09-20 RX ADMIN — IOHEXOL 75 ML: 350 INJECTION, SOLUTION INTRAVENOUS at 08:09

## 2019-09-20 RX ADMIN — CEFTRIAXONE 1 G: 1 INJECTION, SOLUTION INTRAVENOUS at 01:09

## 2019-09-20 RX ADMIN — METRONIDAZOLE 500 MG: 500 INJECTION, SOLUTION INTRAVENOUS at 05:09

## 2019-09-20 RX ADMIN — METRONIDAZOLE 500 MG: 500 INJECTION, SOLUTION INTRAVENOUS at 10:09

## 2019-09-20 NOTE — PROGRESS NOTES
"[facility] Hospital Medicine  Progress Note    Patient Name: Gustabo Callejas  MRN: [id number]  Patient Class: IP- Inpatient   Admission Date: 9/19/2019  Length of Stay: 1 days  Attending Physician: INOCENCIA Ma MD  Primary Care Provider: Primary Doctor No    Subjective:     Principal Problem:Pneumonia of both lower lobes due to infectious organism    HPI:  Mr. Callejas is a 49/M with pertinent history of homelessness, polysubstance abuse who presented to ED 09/19 with a several day history of fatigue, fever, chills, and shortness of breath. He reports that he has been feeling intermittently warm but has not measured his temperature; has also noted feeling "wheezy" and "like I'm not breathing properly." He does not recall specific night sweats other than a recent night spent at the [facility], where he had night sweats, but felt that the temperature was just elevated in the building. He states that he was attempting to be admitted to [facility] to enroll in rehabilitation but had significant fever prior and subsequently presented to the hospital.    He denies a personal history of tuberculosis or HIV, but does report long-term homelessness and prior imprisonments. He reported that he had been given a TB skin test and was due to be read Wednesday, but had not been officially read. Past sexual partners include men and women, but with only one partner in the last six months. He is unsure of any recent weight loss. He reports smoking 8-10 cigarettes a day "when I can get them," marijuana "when I can get it," and cocaine "as often as I can, most days." He states he drinks, but irregularly, with one to two beers per occasion.    Overview/Hospital Course:  No notes on file    Interval History: Febrile overnight. No other concerns at this time.    Review of Systems   Constitutional: Positive for diaphoresis and fever. Negative for chills.   Respiratory: Negative for cough and shortness of " breath.    Cardiovascular: Negative for chest pain and palpitations.   Gastrointestinal: Negative for abdominal pain, nausea and vomiting.     Objective:     Vital Signs (Most Recent):  Temp: 99.1 °F (37.3 °C) (09/20/19 1741)  Pulse: 84 (09/20/19 1741)  Resp: 20 (09/20/19 1741)  BP: (!) 102/59 (09/20/19 1741)  SpO2: (!) 90 % (09/20/19 1741) Vital Signs (24h Range):  Temp:  [98.2 °F (36.8 °C)-101 °F (38.3 °C)] 99.1 °F (37.3 °C)  Pulse:  [74-88] 84  Resp:  [18-21] 20  SpO2:  [89 %-93 %] 90 %  BP: ()/(59-69) 102/59     Weight: 65.8 kg (145 lb 1 oz)  Body mass index is 23.41 kg/m².    Intake/Output Summary (Last 24 hours) at 9/20/2019 1756  Last data filed at 9/20/2019 1320  Gross per 24 hour   Intake 240 ml   Output --   Net 240 ml      Physical Exam   Constitutional: He is oriented to person, place, and time. He appears well-developed. No distress.   HENT:   Head: Normocephalic and atraumatic.   Eyes: Conjunctivae and EOM are normal.   Cardiovascular: Normal rate and intact distal pulses.   Pulmonary/Chest: Effort normal. No respiratory distress.   Bibasilar crackles.   Abdominal: Soft. There is no tenderness.   Musculoskeletal: Normal range of motion. He exhibits no edema.   Neurological: He is alert and oriented to person, place, and time.   Skin: Skin is warm and dry.   Nursing note and vitals reviewed.    Significant Labs:   CBC:  Recent Labs   Lab 09/19/19  1019 09/20/19  0437   WBC 70.49* 57.85*   HGB 11.6* 11.4*   HCT 37.0* 35.9*    403*   GRAN 83.0*  Test Not Performed 70.0   LYMPH 4.0*  Test Not Performed 6.0*  CANCELED   MONO 3.0*  Test Not Performed 5.0  CANCELED   EOS Test Not Performed CANCELED   BASO Test Not Performed CANCELED      BMP:  Recent Labs   Lab 09/19/19  1019 09/20/19  0438    140   K 3.5 3.7    106   CO2 29 25   BUN 13 10   CREATININE 1.0 0.8   GLU 78 70   CALCIUM 10.1 8.8   MG  --  2.1   PHOS  --  3.2     Significant Imaging:   No new imaging this  morning.      Assessment/Plan:      * Pneumonia of both lower lobes due to infectious organism  - Presumed infectious etiology of bilateral lower lobe abnormalities noted on CXR and greatly elevated WBCs. Differentials: viral, bacterial, risk factors for TB, HIV as well, polysubstance abuse with cocaine, marijuana, tobacco (all smoked).  - WBCs to 70s without prior labs for comparison; diff with mostly neutrophils, but some bands, myelocytes and metamyelocytes as well.  - TB quantiferon gold pending, AFB respiratory culture x 3 being collected.  - Continue coverage for CAP with ceftriaxone 1g IV q24hr, azithromycin 500mg IV q24hr.  - Lactic 1.6; procal 0.6. HIV 1/2 pending.  - Appreciate pulmonology assistance.    Normocytic anemia  - Normocytic anemia with no prior labs for comparison.  - Check B12, folate, iron studies, retic count.    Homelessness  - Social work consult for discharge planning.    Polysubstance abuse  - Polysubstance abuse with tobacco, marijuana, and cocaine.  - Cessation counseling; is interested in cessation and was attempting to enter [facility] for detox.    Tobacco abuse  - Cessation counseling.  - Declines nicotine patch.    VTE Risk Mitigation (From admission, onward)        Ordered     enoxaparin injection 40 mg  Daily      09/19/19 1801     IP VTE HIGH RISK PATIENT  Once      09/19/19 1801          SOY Ma MD  Department of Hospital Medicine   [facility]

## 2019-09-20 NOTE — SUBJECTIVE & OBJECTIVE
Interval History: Febrile overnight. No other concerns at this time.    Review of Systems   Constitutional: Positive for diaphoresis and fever. Negative for chills.   Respiratory: Negative for cough and shortness of breath.    Cardiovascular: Negative for chest pain and palpitations.   Gastrointestinal: Negative for abdominal pain, nausea and vomiting.     Objective:     Vital Signs (Most Recent):  Temp: 99.1 °F (37.3 °C) (09/20/19 1741)  Pulse: 84 (09/20/19 1741)  Resp: 20 (09/20/19 1741)  BP: (!) 102/59 (09/20/19 1741)  SpO2: (!) 90 % (09/20/19 1741) Vital Signs (24h Range):  Temp:  [98.2 °F (36.8 °C)-101 °F (38.3 °C)] 99.1 °F (37.3 °C)  Pulse:  [74-88] 84  Resp:  [18-21] 20  SpO2:  [89 %-93 %] 90 %  BP: ()/(59-69) 102/59     Weight: 65.8 kg (145 lb 1 oz)  Body mass index is 23.41 kg/m².    Intake/Output Summary (Last 24 hours) at 9/20/2019 1756  Last data filed at 9/20/2019 1320  Gross per 24 hour   Intake 240 ml   Output --   Net 240 ml      Physical Exam   Constitutional: He is oriented to person, place, and time. He appears well-developed. No distress.   HENT:   Head: Normocephalic and atraumatic.   Eyes: Conjunctivae and EOM are normal.   Cardiovascular: Normal rate and intact distal pulses.   Pulmonary/Chest: Effort normal. No respiratory distress.   Bibasilar crackles.   Abdominal: Soft. There is no tenderness.   Musculoskeletal: Normal range of motion. He exhibits no edema.   Neurological: He is alert and oriented to person, place, and time.   Skin: Skin is warm and dry.   Nursing note and vitals reviewed.    Significant Labs:   CBC:  Recent Labs   Lab 09/19/19  1019 09/20/19  0437   WBC 70.49* 57.85*   HGB 11.6* 11.4*   HCT 37.0* 35.9*    403*   GRAN 83.0*  Test Not Performed 70.0   LYMPH 4.0*  Test Not Performed 6.0*  CANCELED   MONO 3.0*  Test Not Performed 5.0  CANCELED   EOS Test Not Performed CANCELED   BASO Test Not Performed CANCELED      BMP:  Recent Labs   Lab 09/19/19  1017  09/20/19  0438    140   K 3.5 3.7    106   CO2 29 25   BUN 13 10   CREATININE 1.0 0.8   GLU 78 70   CALCIUM 10.1 8.8   MG  --  2.1   PHOS  --  3.2     Significant Imaging:   No new imaging this morning.

## 2019-09-20 NOTE — PLAN OF CARE
Problem: Adult Inpatient Plan of Care  Goal: Plan of Care Review  Outcome: Ongoing (interventions implemented as appropriate)  Pt in no distress on room air, Sputum sample obtained and sent to lab.

## 2019-09-20 NOTE — PLAN OF CARE
Problem: Adult Inpatient Plan of Care  Goal: Plan of Care Review  Outcome: Ongoing (interventions implemented as appropriate)  Pt in bed, no noted acute distress, no complaints of pain, airborne precautions maintained, bed in low locked position, call light in reach, hourly rounds complete, will continue to assess

## 2019-09-20 NOTE — CONSULTS
[facility] Consult Note    Primary Team Admitting:  Anil  Consultant Team Attending:  Summer  Consultant Team Fellow: Deepak    Date of Consult: 2019    REASON FOR CONSULT:     Atypical pneumonia    SUBJECTIVE:     History of Present Illness:  Gustabo Callejas is a 49 y.o. homeless male with hx incarceration (5 mos ago), tobacco dependence and polysubstance abuse presenting to ED with several days of fevers, chills, fatigue and cough with shortness of breath. He admits to weight loss but denies night sweats or chronic breathing problems. He has no hemoptysis or gastrointestinal symptoms. Of note, [facility]. He has no hx of chronic lung disease and reports a recent TST but did not have it read. He is sexually active with both men and women but uses condoms consistently. He says that his HIV status is negative. He often smokes crack cocaine and marijuana but wants to abstain. He only uses alcohol seldomly and denies previous binging or abuse. Patient has significant family history of cancer.       Past Medical History:  History reviewed. No pertinent past medical history.    Allergies:  Review of patient's allergies indicates:   Allergen Reactions    Asa [aspirin] Swelling       Home Medications:  Prior to Admission medications    NONE        OBJECTIVE:     Vital Signs:  BP  Min: 98/61  Max: 114/64  Temp  Av.8 °F (37.7 °C)  Min: 98.5 °F (36.9 °C)  Max: 101.2 °F (38.4 °C)  Pulse  Av.9  Min: 74  Max: 107  Resp  Av.4  Min: 18  Max: 21  SpO2  Av %  Min: 89 %  Max: 95 %  Weight  Av.8 kg (145 lb 1 oz)  Min: 65.8 kg (145 lb 1 oz)  Max: 65.8 kg (145 lb 1 oz)  No intake/output data recorded.    Physical Examination:  General: Alert and awake.  Comfortable. Unkempt   HENT:  NCAT; anicteric sclera with EOMi; OP clear with MMM  Cardio:  Regular rate and rhythm with normal S1 and S2; no  murmurs  Resp:  Unlabored with RLL crackles. Occasional inspiratory wheezes  Abdom: Soft, NTND with normoactive bowel sounds  Extrem: Warm and dry.  No edema  Skin:  No rashes, lesions, or color changes  Lymph: No cervical supraclavicular, axillary or inguinal LAD  Neuro:  AAOx3; cooperative and pleasant with no focal deficits      Laboratory:  Lab Results   Component Value Date    WBC 57.85 (HH) 09/20/2019    HGB 11.4 (L) 09/20/2019    HCT 35.9 (L) 09/20/2019    MCV 97 09/20/2019     (H) 09/20/2019     Lab Results   Component Value Date    CREATININE 0.8 09/20/2019    BUN 10 09/20/2019     09/20/2019    K 3.7 09/20/2019     09/20/2019    CO2 25 09/20/2019     Lab Results   Component Value Date    ALT 25 09/19/2019    AST 24 09/19/2019    ALKPHOS 80 09/19/2019    BILITOT 0.5 09/19/2019         ASSESSMENT:     49 y.o. Homeless and HIV-negative male with hx incarceration (5 mos ago), tobacco dependence and polysubstance abuse presenting to ED with several days of fevers, chills, fatigue and cough with shortness of breath found to have scattered GGo on chest CT concerning for atypical PNA     PLAN:      Obtain sputum cultures and VRP, induce if needed. Flu, HIV neg. PCT 0.6.     TST appears neg. Agree with Quant. AFB pending although low suspicion for TB   Continue CAP and anaerobic coverage (aspiration potential) pending BCx and RCx results   Needs post-acute care placement for ongoing cessation of narcotics   Counseled on need to quit smoking. Offer resources on discharge       Thank you for the consult. Please feel free to contact us with any questions or concerns regarding the care of this patient.  .  Maximo Sotelo MD  [facility], -V  [facility] Consult Note

## 2019-09-20 NOTE — PLAN OF CARE
SW met with pt at bedside to complete discharge assessment, verified PCP and uses [facility] on [address].  Pt is homeless and lives at [address] or in [place].  Pt will need transportation to shelter and assistance with medication.  Pt is interested in substance abuse rehab.     09/20/19 1302   Discharge Assessment   Assessment Type Discharge Planning Assessment   Confirmed/corrected address and phone number on facesheet? Yes   Assessment information obtained from? Patient   Communicated expected length of stay with patient/caregiver no   Prior to hospitilization cognitive status: Alert/Oriented   Prior to hospitalization functional status: Independent   Current cognitive status: Alert/Oriented   Current Functional Status: Independent   Lives With other (see comments)   Able to Return to Prior Arrangements yes   Is patient able to care for self after discharge? Yes   Readmission Within the Last 30 Days no previous admission in last 30 days   Patient currently being followed by outpatient case management? No   Patient currently receives any other outside agency services? No   Equipment Currently Used at Home none   Do you have any problems affording any of your prescribed medications? TBD   Is the patient taking medications as prescribed? yes   Does the patient have transportation home? Yes   Transportation Anticipated   (none)   Does the patient receive services at the [facility]? No   Discharge Plan A Home   DME Needed Upon Discharge  none   Patient/Family in Agreement with Plan yes

## 2019-09-20 NOTE — PROGRESS NOTES
Pt would like to go to [facility].  SW called [facility] [phone], spoke to Macario and was informed that pt had been a client in 2010 and 2013, both admits pt was administratively discharged, but they currently don't have any available beds until Weds    [place] [phone], spoke to Theo, no beds  [facility] [phone], left message for admit, twice and called Leland, Clinical Director [phone]., left message  [facility], [phone], only take pt's with a primary mental health disorder and substance abuse secondary  [facility] in [place], spoke to Marizol [phone], no beds unitl Wed, but can give pt the phone # [phone] and have pt call on Monday and they will try to get him.

## 2019-09-21 LAB
ANION GAP SERPL CALC-SCNC: 8 MMOL/L (ref 8–16)
BASOPHILS # BLD AUTO: ABNORMAL K/UL (ref 0–0.2)
BASOPHILS NFR BLD: 1 % (ref 0–1.9)
BUN SERPL-MCNC: 11 MG/DL (ref 6–20)
CALCIUM SERPL-MCNC: 8.5 MG/DL (ref 8.7–10.5)
CHLORIDE SERPL-SCNC: 106 MMOL/L (ref 95–110)
CO2 SERPL-SCNC: 25 MMOL/L (ref 23–29)
CREAT SERPL-MCNC: 0.8 MG/DL (ref 0.5–1.4)
DIFFERENTIAL METHOD: ABNORMAL
EOSINOPHIL # BLD AUTO: ABNORMAL K/UL (ref 0–0.5)
EOSINOPHIL NFR BLD: 0 % (ref 0–8)
ERYTHROCYTE [DISTWIDTH] IN BLOOD BY AUTOMATED COUNT: 14.9 % (ref 11.5–14.5)
EST. GFR  (AFRICAN AMERICAN): >60 ML/MIN/1.73 M^2
EST. GFR  (NON AFRICAN AMERICAN): >60 ML/MIN/1.73 M^2
GLUCOSE SERPL-MCNC: 91 MG/DL (ref 70–110)
HCT VFR BLD AUTO: 34.1 % (ref 40–54)
HGB BLD-MCNC: 11 G/DL (ref 14–18)
IMM GRANULOCYTES # BLD AUTO: ABNORMAL K/UL (ref 0–0.04)
IMM GRANULOCYTES NFR BLD AUTO: ABNORMAL % (ref 0–0.5)
LYMPHOCYTES # BLD AUTO: ABNORMAL K/UL (ref 1–4.8)
LYMPHOCYTES NFR BLD: 10 % (ref 18–48)
MAGNESIUM SERPL-MCNC: 2 MG/DL (ref 1.6–2.6)
MCH RBC QN AUTO: 31.1 PG (ref 27–31)
MCHC RBC AUTO-ENTMCNC: 32.3 G/DL (ref 32–36)
MCV RBC AUTO: 96 FL (ref 82–98)
METAMYELOCYTES NFR BLD MANUAL: 5 %
MONOCYTES # BLD AUTO: ABNORMAL K/UL (ref 0.3–1)
MONOCYTES NFR BLD: 7 % (ref 4–15)
MYELOCYTES NFR BLD MANUAL: 5 %
NEUTROPHILS NFR BLD: 70 % (ref 38–73)
NEUTS BAND NFR BLD MANUAL: 2 %
NRBC BLD-RTO: 3 /100 WBC
PHOSPHATE SERPL-MCNC: 3 MG/DL (ref 2.7–4.5)
PLATELET # BLD AUTO: 396 K/UL (ref 150–350)
PMV BLD AUTO: 10.3 FL (ref 9.2–12.9)
POTASSIUM SERPL-SCNC: 4 MMOL/L (ref 3.5–5.1)
RBC # BLD AUTO: 3.54 M/UL (ref 4.6–6.2)
SODIUM SERPL-SCNC: 139 MMOL/L (ref 136–145)
WBC # BLD AUTO: 58.43 K/UL (ref 3.9–12.7)

## 2019-09-21 PROCEDURE — 99232 PR SUBSEQUENT HOSPITAL CARE,LEVL II: ICD-10-PCS | Mod: ,,, | Performed by: INTERNAL MEDICINE

## 2019-09-21 PROCEDURE — 85007 BL SMEAR W/DIFF WBC COUNT: CPT

## 2019-09-21 PROCEDURE — 63600175 PHARM REV CODE 636 W HCPCS: Performed by: INTERNAL MEDICINE

## 2019-09-21 PROCEDURE — 83735 ASSAY OF MAGNESIUM: CPT

## 2019-09-21 PROCEDURE — 80048 BASIC METABOLIC PNL TOTAL CA: CPT

## 2019-09-21 PROCEDURE — S0030 INJECTION, METRONIDAZOLE: HCPCS | Performed by: INTERNAL MEDICINE

## 2019-09-21 PROCEDURE — 99232 SBSQ HOSP IP/OBS MODERATE 35: CPT | Mod: ,,, | Performed by: INTERNAL MEDICINE

## 2019-09-21 PROCEDURE — 25000003 PHARM REV CODE 250: Performed by: INTERNAL MEDICINE

## 2019-09-21 PROCEDURE — 84100 ASSAY OF PHOSPHORUS: CPT

## 2019-09-21 PROCEDURE — 85027 COMPLETE CBC AUTOMATED: CPT

## 2019-09-21 PROCEDURE — 99233 PR SUBSEQUENT HOSPITAL CARE,LEVL III: ICD-10-PCS | Mod: ,,, | Performed by: INTERNAL MEDICINE

## 2019-09-21 PROCEDURE — 36415 COLL VENOUS BLD VENIPUNCTURE: CPT

## 2019-09-21 PROCEDURE — 11000001 HC ACUTE MED/SURG PRIVATE ROOM

## 2019-09-21 PROCEDURE — 99233 SBSQ HOSP IP/OBS HIGH 50: CPT | Mod: ,,, | Performed by: INTERNAL MEDICINE

## 2019-09-21 PROCEDURE — 94761 N-INVAS EAR/PLS OXIMETRY MLT: CPT

## 2019-09-21 RX ADMIN — METRONIDAZOLE 500 MG: 500 INJECTION, SOLUTION INTRAVENOUS at 10:09

## 2019-09-21 RX ADMIN — CEFTRIAXONE 1 G: 1 INJECTION, SOLUTION INTRAVENOUS at 12:09

## 2019-09-21 RX ADMIN — ENOXAPARIN SODIUM 40 MG: 100 INJECTION SUBCUTANEOUS at 05:09

## 2019-09-21 RX ADMIN — METRONIDAZOLE 500 MG: 500 INJECTION, SOLUTION INTRAVENOUS at 02:09

## 2019-09-21 RX ADMIN — AZITHROMYCIN MONOHYDRATE 500 MG: 500 INJECTION, POWDER, LYOPHILIZED, FOR SOLUTION INTRAVENOUS at 12:09

## 2019-09-21 RX ADMIN — METRONIDAZOLE 500 MG: 500 INJECTION, SOLUTION INTRAVENOUS at 05:09

## 2019-09-21 NOTE — SUBJECTIVE & OBJECTIVE
Interval History: No acute events overnight. Feeling improved. No new concerns at this time.     Review of Systems   Constitutional: Negative for chills and fever.   Respiratory: Positive for cough. Negative for shortness of breath.    Cardiovascular: Negative for chest pain and palpitations.   Gastrointestinal: Negative for abdominal pain, nausea and vomiting.     Objective:     Vital Signs (Most Recent):  Temp: 98.4 °F (36.9 °C) (09/21/19 1000)  Pulse: 88 (09/21/19 1000)  Resp: 20 (09/21/19 1000)  BP: (!) 97/59 (09/21/19 1000)  SpO2: (!) 94 % (09/21/19 1000) Vital Signs (24h Range):  Temp:  [98.2 °F (36.8 °C)-99.1 °F (37.3 °C)] 98.4 °F (36.9 °C)  Pulse:  [66-94] 88  Resp:  [16-20] 20  SpO2:  [90 %-94 %] 94 %  BP: ()/(50-66) 97/59     Weight: 66.2 kg (145 lb 15.1 oz)  Body mass index is 23.56 kg/m².    Intake/Output Summary (Last 24 hours) at 9/21/2019 1200  Last data filed at 9/20/2019 1320  Gross per 24 hour   Intake 240 ml   Output --   Net 240 ml      Physical Exam   Constitutional: He is oriented to person, place, and time. He appears well-developed. No distress.   HENT:   Head: Normocephalic and atraumatic.   Eyes: Conjunctivae and EOM are normal.   Cardiovascular: Normal rate and intact distal pulses.   Pulmonary/Chest: Effort normal. No respiratory distress.   Mild bibasilar crackles.   Abdominal: Soft. There is no tenderness.   Musculoskeletal: Normal range of motion. He exhibits no edema.   Neurological: He is alert and oriented to person, place, and time.   Skin: Skin is warm and dry.   Nursing note and vitals reviewed.    Significant Labs:   CBC:  Recent Labs   Lab 09/19/19  1019 09/20/19  0437 09/21/19  0352   WBC 70.49* 57.85* 58.43*   HGB 11.6* 11.4* 11.0*   HCT 37.0* 35.9* 34.1*    403* 396*   GRAN 83.0*  Test Not Performed 70.0 70.0   LYMPH 4.0*  Test Not Performed 6.0*  CANCELED 10.0*  CANCELED   MONO 3.0*  Test Not Performed 5.0  CANCELED 7.0  CANCELED   EOS Test Not Performed  CANCELED CANCELED   BASO Test Not Performed CANCELED CANCELED      BMP:  Recent Labs   Lab 09/19/19  1019 09/20/19  0438 09/21/19  0353    140 139   K 3.5 3.7 4.0    106 106   CO2 29 25 25   BUN 13 10 11   CREATININE 1.0 0.8 0.8   GLU 78 70 91   CALCIUM 10.1 8.8 8.5*   MG  --  2.1 2.0   PHOS  --  3.2 3.0     Significant Imaging:   CT Chest W Contrast 09/20/19:  Multifocal areas of nonsegmental ground-glass attenuation in the central aspects of the bilateral upper and bilateral lower lobes, which likely relates to atypical pneumonia or pulmonary edema.  Follow-up to radiographic resolution is recommended with repeat chest x-ray in 6-8 weeks as suggested on prior examination.

## 2019-09-21 NOTE — PLAN OF CARE
Problem: Adult Inpatient Plan of Care  Goal: Plan of Care Review  Outcome: Ongoing (interventions implemented as appropriate)  Pt in no distress on room air.

## 2019-09-21 NOTE — PROGRESS NOTES
"[facility] Hospital Medicine  Progress Note    Patient Name: Gustabo Callejas  MRN: [id number]  Patient Class: IP- Inpatient   Admission Date: 9/19/2019  Length of Stay: 2 days  Attending Physician: INOCENCIA Ma MD  Primary Care Provider: Primary Doctor No    Subjective:     Principal Problem:Pneumonia of both lower lobes due to infectious organism    HPI:  Mr. Callejas is a 49/M with pertinent history of homelessness, polysubstance abuse who presented to ED 09/19 with a several day history of fatigue, fever, chills, and shortness of breath. He reports that he has been feeling intermittently warm but has not measured his temperature; has also noted feeling "wheezy" and "like I'm not breathing properly." He does not recall specific night sweats other than a recent night spent at the [facility], where he had night sweats, but felt that the temperature was just elevated in the building. He states that he was attempting to be admitted to [facility] to enroll in rehabilitation but had significant fever prior and subsequently presented to the hospital.    He denies a personal history of tuberculosis or HIV, but does report long-term homelessness and prior imprisonments. He reported that he had been given a TB skin test and was due to be read Wednesday, but had not been officially read. Past sexual partners include men and women, but with only one partner in the last six months. He is unsure of any recent weight loss. He reports smoking 8-10 cigarettes a day "when I can get them," marijuana "when I can get it," and cocaine "as often as I can, most days." He states he drinks, but irregularly, with one to two beers per occasion.    Overview/Hospital Course:  After admission Mr. Callejas was started on ceftriaxone and azithromycin, and TB quantiferon and AFBs were ordered for further evaluation. Pulmonology was consulted and antibiotics extended to metronidazole IV for potential aspiration as " well. Leukocytosis was slow to improve.    Interval History: No acute events overnight. Feeling improved. No new concerns at this time.     Review of Systems   Constitutional: Negative for chills and fever.   Respiratory: Positive for cough. Negative for shortness of breath.    Cardiovascular: Negative for chest pain and palpitations.   Gastrointestinal: Negative for abdominal pain, nausea and vomiting.     Objective:     Vital Signs (Most Recent):  Temp: 98.4 °F (36.9 °C) (09/21/19 1000)  Pulse: 88 (09/21/19 1000)  Resp: 20 (09/21/19 1000)  BP: (!) 97/59 (09/21/19 1000)  SpO2: (!) 94 % (09/21/19 1000) Vital Signs (24h Range):  Temp:  [98.2 °F (36.8 °C)-99.1 °F (37.3 °C)] 98.4 °F (36.9 °C)  Pulse:  [66-94] 88  Resp:  [16-20] 20  SpO2:  [90 %-94 %] 94 %  BP: ()/(50-66) 97/59     Weight: 66.2 kg (145 lb 15.1 oz)  Body mass index is 23.56 kg/m².    Intake/Output Summary (Last 24 hours) at 9/21/2019 1200  Last data filed at 9/20/2019 1320  Gross per 24 hour   Intake 240 ml   Output --   Net 240 ml      Physical Exam   Constitutional: He is oriented to person, place, and time. He appears well-developed. No distress.   HENT:   Head: Normocephalic and atraumatic.   Eyes: Conjunctivae and EOM are normal.   Cardiovascular: Normal rate and intact distal pulses.   Pulmonary/Chest: Effort normal. No respiratory distress.   Mild bibasilar crackles.   Abdominal: Soft. There is no tenderness.   Musculoskeletal: Normal range of motion. He exhibits no edema.   Neurological: He is alert and oriented to person, place, and time.   Skin: Skin is warm and dry.   Nursing note and vitals reviewed.    Significant Labs:   CBC:  Recent Labs   Lab 09/19/19  1019 09/20/19  0437 09/21/19  0352   WBC 70.49* 57.85* 58.43*   HGB 11.6* 11.4* 11.0*   HCT 37.0* 35.9* 34.1*    403* 396*   GRAN 83.0*  Test Not Performed 70.0 70.0   LYMPH 4.0*  Test Not Performed 6.0*  CANCELED 10.0*  CANCELED   MONO 3.0*  Test Not Performed 5.0   CANCELED 7.0  CANCELED   EOS Test Not Performed CANCELED CANCELED   BASO Test Not Performed CANCELED CANCELED      BMP:  Recent Labs   Lab 09/19/19  1019 09/20/19  0438 09/21/19  0353    140 139   K 3.5 3.7 4.0    106 106   CO2 29 25 25   BUN 13 10 11   CREATININE 1.0 0.8 0.8   GLU 78 70 91   CALCIUM 10.1 8.8 8.5*   MG  --  2.1 2.0   PHOS  --  3.2 3.0     Significant Imaging:   CT Chest W Contrast 09/20/19:  Multifocal areas of nonsegmental ground-glass attenuation in the central aspects of the bilateral upper and bilateral lower lobes, which likely relates to atypical pneumonia or pulmonary edema.  Follow-up to radiographic resolution is recommended with repeat chest x-ray in 6-8 weeks as suggested on prior examination.      Assessment/Plan:      * Pneumonia of both lower lobes due to infectious organism  - Presumed infectious etiology of bilateral lower lobe abnormalities noted on CXR and greatly elevated WBCs. Differentials: viral, bacterial, risk factors for TB, HIV as well, polysubstance abuse with cocaine, marijuana, tobacco (all smoked).  - Significant leukocytosis mildly improved from presentation.  - TB quantiferon gold pending, AFB respiratory cultures pending.  - Continue ceftriaxone 1g IV q24hr, azithromycin 500mg IV q24hr, metronidazole 500mg IV q24hr.  - Procal 0.6. HIV 1/2 pending.  - Appreciate pulmonology assistance.    Normocytic anemia  - Normocytic anemia with no prior labs for comparison.  - Check B12, folate, iron studies, retic count.    Homelessness  - Social work consult for discharge planning.    Polysubstance abuse  - Polysubstance abuse with tobacco, marijuana, and cocaine.  - Cessation counseling; is interested in cessation and was attempting to enter [facility] for detox.    Tobacco abuse  - Cessation counseling.  - Declines nicotine patch.    VTE Risk Mitigation (From admission, onward)        Ordered     enoxaparin injection 40 mg  Daily      09/19/19 1801     IP VTE  HIGH RISK PATIENT  Once      09/19/19 1801        SOY Ma MD  Department of Hospital Medicine   [facility]

## 2019-09-21 NOTE — PLAN OF CARE
Problem: Adult Inpatient Plan of Care  Goal: Plan of Care Review  Outcome: Ongoing (interventions implemented as appropriate)  Patient in no noted acute distress.safety maintained and call light in reach,hourly rounding done bed in low locked position.Less coughing patient up to restroom without difficulty.ABTs in progress.V/s stable.will cont to assess

## 2019-09-21 NOTE — PROGRESS NOTES
U Pulmonology & Critical Care Consult Note    Primary Team Admitting:  Anil  Consultant Team Attending:  Kirsty  Consultant Team Fellow: Eve    Date of Consult: 2019    REASON FOR CONSULT:     Atypical pneumonia    SUBJECTIVE:   No acute events overnight. Patient reports feeling drastically better this morning though still have nagging nonproductive cough. No additional fevers for past 24 hours. Has been ambulating around room without any SOB.        OBJECTIVE:     Vital Signs:  BP  Min: 93/50  Max: 103/63  Temp  Av.6 °F (37 °C)  Min: 98.2 °F (36.8 °C)  Max: 99.1 °F (37.3 °C)  Pulse  Av.1  Min: 66  Max: 88  Resp  Av  Min: 16  Max: 20  SpO2  Av.9 %  Min: 89 %  Max: 92 %  Weight  Av.2 kg (145 lb 15.1 oz)  Min: 66.2 kg (145 lb 15.1 oz)  Max: 66.2 kg (145 lb 15.1 oz)  I/O last 3 completed shifts:  In: 240 [P.O.:240]  Out: -     Physical Examination:  General: Alert and awake.  Comfortable. Well appearing  HENT:  NCAT; anicteric sclera with EOMi; OP clear with MMM  Cardio:  Regular rate and rhythm with normal S1 and S2; no murmurs  Resp:  Unlabored with RLL crackles. Occasional inspiratory wheezes  Abdom:  Soft, NTND with normoactive bowel sounds  Extrem:  Warm and dry.  No edema  Skin:  No rashes, lesions, or color changes  Lymph:  No cervical supraclavicular, axillary or inguinal LAD  Neuro:  AAOx3; cooperative and pleasant with no focal deficits      Laboratory:  Lab Results   Component Value Date    WBC 58.43 (HH) 2019    HGB 11.0 (L) 2019    HCT 34.1 (L) 2019    MCV 96 2019     (H) 2019     Lab Results   Component Value Date    CREATININE 0.8 2019    BUN 11 2019     2019    K 4.0 2019     2019    CO2 25 2019     Lab Results   Component Value Date    ALT 25 2019    AST 24 2019    ALKPHOS 80 2019    BILITOT 0.5 2019         ASSESSMENT:     Mr. Callejas is a 49 y.o. Homeless  and HIV-negative male with hx incarceration (5 mos ago), tobacco dependence and polysubstance abuse presenting to ED with several days of fevers, chills, fatigue and cough with shortness of breath found to have peribronchovascular predominant scattered GGo on chest CT concerning for atypical PNA vs inhalation pneumonitis     PLAN:      VRP pending, no respiratory culture performed. Flu, HIV neg. PCT 0.6.     Quant gold pending   AFB sputum 9/19, 9/20 negative stain, culture pending; need 1 more to rule out which is pending. low suspicion for TB   Continue CAP and anaerobic coverage (aspiration potential) pending BCx and RCx results   Counseled on need to quit smoking and illicit drug use, primary team working with  for post-discharge resources on substance abuse.   Leukocytosis improved but still remarkably elevated, possible 2/2 infectious/inflammatory process however fails to improve, could consider malignancy. If remains elevated despite therapy consider pathology review of smear vs heme consult.       Thank you for the consult. Please feel free to contact us with any questions or concerns regarding the care of this patient.      Asia Prado MD  Pulmonary / Critical Care Fellow, PGY4  #[phone]  09/21/2019  7:53 AM

## 2019-09-21 NOTE — HOSPITAL COURSE
After admission Mr. Callejas was started on ceftriaxone and azithromycin, and TB quantiferon and AFBs were ordered for further evaluation. Pulmonology was consulted and antibiotics extended to metronidazole IV for potential aspiration as well. Leukocytosis was slow to improve, and given his peripheral smear findings with myelocytes and metamyelocytes hematology/oncology was consulted. He was referred to Hem/Onc at [facility] for further work-up. He completed course of antibiotics. He is stable and ready for discharge to [facility] today.

## 2019-09-21 NOTE — ASSESSMENT & PLAN NOTE
- Presumed infectious etiology of bilateral lower lobe abnormalities noted on CXR and greatly elevated WBCs. Differentials: viral, bacterial, risk factors for TB, HIV as well, polysubstance abuse with cocaine, marijuana, tobacco (all smoked).  - Significant leukocytosis mildly improved from presentation.  - TB quantiferon gold pending, AFB respiratory cultures pending.  - Continue ceftriaxone 1g IV q24hr, azithromycin 500mg IV q24hr, metronidazole 500mg IV q24hr.  - Procal 0.6. HIV 1/2 pending.  - Appreciate pulmonology assistance.

## 2019-09-21 NOTE — PLAN OF CARE
Marizol from [facility] in [place] reports they will have a bed available for patient Sun 9/22 if he is interested in transferring there for inpatient substance abuse treatment - on call admit # [phone]

## 2019-09-21 NOTE — PLAN OF CARE
Problem: Fall Injury Risk  Goal: Absence of Fall and Fall-Related Injury    Intervention: Promote Injury-Free Environment     09/21/19 0636   Optimize Balance and Safe Activity   Safety Promotion/Fall Prevention assistive device/personal item within reach;Fall Risk reviewed with patient/family;lighting adjusted;medications reviewed;nonskid shoes/socks when out of bed;side rails raised x 2         Problem: Adult Inpatient Plan of Care  Goal: Plan of Care Review     09/21/19 0636   Plan of Care Review   Plan of Care Reviewed With patient   Progress improving       Problem: Infection  Goal: Infection Symptom Resolution    Intervention: Prevent or Manage Infection     09/21/19 0636   Manage Diarrhea   Isolation Precautions airborne precautions maintained   Prevent or Manage Infection   Infection Management aseptic technique maintained;cultures obtained and sent to lab

## 2019-09-22 PROBLEM — D75.839 THROMBOCYTOSIS: Status: ACTIVE | Noted: 2019-09-22

## 2019-09-22 LAB
ANION GAP SERPL CALC-SCNC: 7 MMOL/L (ref 8–16)
ANISOCYTOSIS BLD QL SMEAR: SLIGHT
BASOPHILS # BLD AUTO: ABNORMAL K/UL (ref 0–0.2)
BASOPHILS NFR BLD: 4 % (ref 0–1.9)
BUN SERPL-MCNC: 10 MG/DL (ref 6–20)
CALCIUM SERPL-MCNC: 9 MG/DL (ref 8.7–10.5)
CHLORIDE SERPL-SCNC: 105 MMOL/L (ref 95–110)
CO2 SERPL-SCNC: 28 MMOL/L (ref 23–29)
CREAT SERPL-MCNC: 0.8 MG/DL (ref 0.5–1.4)
DIFFERENTIAL METHOD: ABNORMAL
EOSINOPHIL # BLD AUTO: ABNORMAL K/UL (ref 0–0.5)
EOSINOPHIL NFR BLD: 1 % (ref 0–8)
ERYTHROCYTE [DISTWIDTH] IN BLOOD BY AUTOMATED COUNT: 14.8 % (ref 11.5–14.5)
EST. GFR  (AFRICAN AMERICAN): >60 ML/MIN/1.73 M^2
EST. GFR  (NON AFRICAN AMERICAN): >60 ML/MIN/1.73 M^2
GLUCOSE SERPL-MCNC: 79 MG/DL (ref 70–110)
HCT VFR BLD AUTO: 35.7 % (ref 40–54)
HGB BLD-MCNC: 11.4 G/DL (ref 14–18)
IMM GRANULOCYTES # BLD AUTO: ABNORMAL K/UL (ref 0–0.04)
IMM GRANULOCYTES NFR BLD AUTO: ABNORMAL % (ref 0–0.5)
LDH SERPL L TO P-CCNC: 647 U/L (ref 110–260)
LYMPHOCYTES # BLD AUTO: ABNORMAL K/UL (ref 1–4.8)
LYMPHOCYTES NFR BLD: 4 % (ref 18–48)
MAGNESIUM SERPL-MCNC: 2.3 MG/DL (ref 1.6–2.6)
MCH RBC QN AUTO: 31 PG (ref 27–31)
MCHC RBC AUTO-ENTMCNC: 31.9 G/DL (ref 32–36)
MCV RBC AUTO: 97 FL (ref 82–98)
METAMYELOCYTES NFR BLD MANUAL: 15 %
MONOCYTES # BLD AUTO: ABNORMAL K/UL (ref 0.3–1)
MONOCYTES NFR BLD: 7 % (ref 4–15)
MYELOCYTES NFR BLD MANUAL: 5 %
NEUTROPHILS NFR BLD: 51 % (ref 38–73)
NEUTS BAND NFR BLD MANUAL: 13 %
NRBC BLD-RTO: 6 /100 WBC
PHOSPHATE SERPL-MCNC: 4 MG/DL (ref 2.7–4.5)
PLATELET # BLD AUTO: 442 K/UL (ref 150–350)
PLATELET BLD QL SMEAR: ABNORMAL
PMV BLD AUTO: 11 FL (ref 9.2–12.9)
POTASSIUM SERPL-SCNC: 4.2 MMOL/L (ref 3.5–5.1)
RBC # BLD AUTO: 3.68 M/UL (ref 4.6–6.2)
SODIUM SERPL-SCNC: 140 MMOL/L (ref 136–145)
URATE SERPL-MCNC: 4.4 MG/DL (ref 3.4–7)
WBC # BLD AUTO: 61.8 K/UL (ref 3.9–12.7)

## 2019-09-22 PROCEDURE — 30000890 HC MISC. SEND OUT TEST

## 2019-09-22 PROCEDURE — 99233 PR SUBSEQUENT HOSPITAL CARE,LEVL III: ICD-10-PCS | Mod: ,,, | Performed by: INTERNAL MEDICINE

## 2019-09-22 PROCEDURE — 11000001 HC ACUTE MED/SURG PRIVATE ROOM

## 2019-09-22 PROCEDURE — 84100 ASSAY OF PHOSPHORUS: CPT

## 2019-09-22 PROCEDURE — 80048 BASIC METABOLIC PNL TOTAL CA: CPT

## 2019-09-22 PROCEDURE — 80074 ACUTE HEPATITIS PANEL: CPT

## 2019-09-22 PROCEDURE — 85060 BLOOD SMEAR INTERPRETATION: CPT | Mod: ,,, | Performed by: PATHOLOGY

## 2019-09-22 PROCEDURE — 84550 ASSAY OF BLOOD/URIC ACID: CPT

## 2019-09-22 PROCEDURE — 85007 BL SMEAR W/DIFF WBC COUNT: CPT

## 2019-09-22 PROCEDURE — 81207 BCR/ABL1 GENE MINOR BP: CPT

## 2019-09-22 PROCEDURE — 25000003 PHARM REV CODE 250: Performed by: INTERNAL MEDICINE

## 2019-09-22 PROCEDURE — 83735 ASSAY OF MAGNESIUM: CPT

## 2019-09-22 PROCEDURE — S0030 INJECTION, METRONIDAZOLE: HCPCS | Performed by: INTERNAL MEDICINE

## 2019-09-22 PROCEDURE — 94761 N-INVAS EAR/PLS OXIMETRY MLT: CPT

## 2019-09-22 PROCEDURE — 83615 LACTATE (LD) (LDH) ENZYME: CPT

## 2019-09-22 PROCEDURE — 99233 SBSQ HOSP IP/OBS HIGH 50: CPT | Mod: ,,, | Performed by: INTERNAL MEDICINE

## 2019-09-22 PROCEDURE — 99232 SBSQ HOSP IP/OBS MODERATE 35: CPT | Mod: ,,, | Performed by: INTERNAL MEDICINE

## 2019-09-22 PROCEDURE — 36415 COLL VENOUS BLD VENIPUNCTURE: CPT

## 2019-09-22 PROCEDURE — 99232 PR SUBSEQUENT HOSPITAL CARE,LEVL II: ICD-10-PCS | Mod: ,,, | Performed by: INTERNAL MEDICINE

## 2019-09-22 PROCEDURE — 85027 COMPLETE CBC AUTOMATED: CPT

## 2019-09-22 PROCEDURE — 85060 PATHOLOGIST REVIEW: ICD-10-PCS | Mod: ,,, | Performed by: PATHOLOGY

## 2019-09-22 PROCEDURE — 63600175 PHARM REV CODE 636 W HCPCS: Performed by: INTERNAL MEDICINE

## 2019-09-22 RX ORDER — BENZONATATE 100 MG/1
100 CAPSULE ORAL 3 TIMES DAILY PRN
Status: DISCONTINUED | OUTPATIENT
Start: 2019-09-22 | End: 2019-09-24 | Stop reason: HOSPADM

## 2019-09-22 RX ADMIN — METRONIDAZOLE 500 MG: 500 INJECTION, SOLUTION INTRAVENOUS at 09:09

## 2019-09-22 RX ADMIN — METRONIDAZOLE 500 MG: 500 INJECTION, SOLUTION INTRAVENOUS at 02:09

## 2019-09-22 RX ADMIN — CEFTRIAXONE 1 G: 1 INJECTION, SOLUTION INTRAVENOUS at 12:09

## 2019-09-22 RX ADMIN — BENZONATATE 100 MG: 100 CAPSULE ORAL at 01:09

## 2019-09-22 RX ADMIN — METRONIDAZOLE 500 MG: 500 INJECTION, SOLUTION INTRAVENOUS at 05:09

## 2019-09-22 RX ADMIN — ENOXAPARIN SODIUM 40 MG: 100 INJECTION SUBCUTANEOUS at 05:09

## 2019-09-22 RX ADMIN — AZITHROMYCIN MONOHYDRATE 500 MG: 500 INJECTION, POWDER, LYOPHILIZED, FOR SOLUTION INTRAVENOUS at 01:09

## 2019-09-22 NOTE — ASSESSMENT & PLAN NOTE
- Normocytic anemia with no prior labs for comparison.  - Iron studies most consistent with anemia of chronic disease.  - As above.

## 2019-09-22 NOTE — ASSESSMENT & PLAN NOTE
- peripheral smear reviewed. +myelocytes and metamyelocytes  - anemia mild, Plt count elevated  - suspect CML. Albeit he does not have splenomegaly  - BCR/ABL ordered and pending  - discussed with patient that he may have CML, which is a chronic leukemia that can be treated with oral pills taken indefinitively  - discussed with Dr Jacobo. Please confirm with  re insurance status. If he does have medicaid, can transfer to main [place] to facilitate workup including bone marrow biopsy, as patient is homeless.

## 2019-09-22 NOTE — PROGRESS NOTES
"[facility] Hospital Medicine  Progress Note    Patient Name: Gustabo Callejas  MRN: [id number]  Patient Class: IP- Inpatient   Admission Date: 9/19/2019  Length of Stay: 3 days  Attending Physician: INOCENCIA Ma MD  Primary Care Provider: Primary Doctor No        Subjective:     Principal Problem:Pneumonia of both lower lobes due to infectious organism        HPI:  Mr. Callejas is a 49/M with pertinent history of homelessness, polysubstance abuse who presented to ED 09/19 with a several day history of fatigue, fever, chills, and shortness of breath. He reports that he has been feeling intermittently warm but has not measured his temperature; has also noted feeling "wheezy" and "like I'm not breathing properly." He does not recall specific night sweats other than a recent night spent at the [facility], where he had night sweats, but felt that the temperature was just elevated in the building. He states that he was attempting to be admitted to [facility] to enroll in rehabilitation but had significant fever prior and subsequently presented to the hospital.    He denies a personal history of tuberculosis or HIV, but does report long-term homelessness and prior imprisonments. He reported that he had been given a TB skin test and was due to be read Wednesday, but had not been officially read. Past sexual partners include men and women, but with only one partner in the last six months. He is unsure of any recent weight loss. He reports smoking 8-10 cigarettes a day "when I can get them," marijuana "when I can get it," and cocaine "as often as I can, most days." He states he drinks, but irregularly, with one to two beers per occasion.    Overview/Hospital Course:  After admission Mr. Callejas was started on ceftriaxone and azithromycin, and TB quantiferon and AFBs were ordered for further evaluation. Pulmonology was consulted and antibiotics extended to metronidazole IV for potential aspiration " as well. Leukocytosis was slow to improve, and given his peripheral smear findings with myelocytes and metamyelocytes hematology/oncology was consulted.    Interval History: No acute events overnight. Feeling improved. No new concerns at this time.     Review of Systems   Constitutional: Negative for chills and fever.   Respiratory: Positive for cough. Negative for shortness of breath.    Cardiovascular: Negative for chest pain and palpitations.   Gastrointestinal: Negative for abdominal pain, nausea and vomiting.     Objective:     Vital Signs (Most Recent):  Temp: 97.9 °F (36.6 °C) (09/22/19 1223)  Pulse: 67 (09/22/19 1223)  Resp: 18 (09/22/19 1223)  BP: (!) 96/57 (09/22/19 1223)  SpO2: (!) 92 % (09/22/19 1223) Vital Signs (24h Range):  Temp:  [97.9 °F (36.6 °C)-98.7 °F (37.1 °C)] 97.9 °F (36.6 °C)  Pulse:  [56-78] 67  Resp:  [16-20] 18  SpO2:  [92 %-95 %] 92 %  BP: ()/(51-62) 96/57     Weight: 66.2 kg (145 lb 15.1 oz)  Body mass index is 23.56 kg/m².    Intake/Output Summary (Last 24 hours) at 9/22/2019 1555  Last data filed at 9/21/2019 1708  Gross per 24 hour   Intake 100 ml   Output 3 ml   Net 97 ml      Physical Exam   Constitutional: He is oriented to person, place, and time. He appears well-developed. No distress.   HENT:   Head: Normocephalic and atraumatic.   Eyes: Conjunctivae and EOM are normal.   Cardiovascular: Normal rate and intact distal pulses.   Pulmonary/Chest: Effort normal. No respiratory distress.   Mild bibasilar crackles.   Abdominal: Soft. There is no tenderness.   Musculoskeletal: Normal range of motion. He exhibits no edema.   Neurological: He is alert and oriented to person, place, and time.   Skin: Skin is warm and dry.   Nursing note and vitals reviewed.    Significant Labs:   CBC:  Recent Labs   Lab 09/20/19  0437 09/21/19  0352 09/22/19  0441   WBC 57.85* 58.43* 61.80*   HGB 11.4* 11.0* 11.4*   HCT 35.9* 34.1* 35.7*   * 396* 442*   GRAN 70.0 70.0 51.0   LYMPH 6.0*   CANCELED 10.0*  CANCELED 4.0*  CANCELED   MONO 5.0  CANCELED 7.0  CANCELED 7.0  CANCELED   EOS CANCELED CANCELED CANCELED   BASO CANCELED CANCELED CANCELED      BMP:  Recent Labs   Lab 09/20/19  0438 09/21/19  0353 09/22/19  0441    139 140   K 3.7 4.0 4.2    106 105   CO2 25 25 28   BUN 10 11 10   CREATININE 0.8 0.8 0.8   GLU 70 91 79   CALCIUM 8.8 8.5* 9.0   MG 2.1 2.0 2.3   PHOS 3.2 3.0 4.0     Significant Imaging:   No new imaging this morning.      Assessment/Plan:      * Pneumonia of both lower lobes due to infectious organism  - Presumed infectious etiology of bilateral lower lobe abnormalities noted on CXR and greatly elevated WBCs. Differentials: viral, bacterial, risk factors for TB, HIV as well, polysubstance abuse with cocaine, marijuana, tobacco (all smoked). Higher suspicion for CAP with significant leukocytosis representing separate issue.  - TB quantiferon gold pending, AFB respiratory cultures in process.   - Continue ceftriaxone 1g IV q24hr, azithromycin 500mg IV q24hr, metronidazole 500mg IV q24hr to complete 5 day total course.  - Procal 0.6. HIV 1/2 negative.  - Appreciate pulmonology assistance.    Normocytic anemia  - Normocytic anemia with no prior labs for comparison.  - Iron studies most consistent with anemia of chronic disease.  - As above.    Leukocytosis  - Significant leukocytosis with myelocytes and metamyelocytes on peripheral smear.  - ?leukemia given persistence despite therapy for infectious causes. LDH elevated on admit to 746.  - Consult hematology/oncology for further evaluation and management recommendations; may require BMBx.    Homelessness  - Social work consult for discharge planning.    Polysubstance abuse  - Polysubstance abuse with tobacco, marijuana, and cocaine.  - Cessation counseling; is interested in cessation and was attempting to enter [facility] for detox.    Tobacco abuse  - Cessation counseling.  - Declines nicotine patch.    VTE Risk  Mitigation (From admission, onward)         Ordered     enoxaparin injection 40 mg  Daily      09/19/19 1801     IP VTE HIGH RISK PATIENT  Once      09/19/19 1801                      D Scott Ma MD  Department of Hospital Medicine   [facility]

## 2019-09-22 NOTE — PROGRESS NOTES
U Pulmonology & Critical Care Consult Note    Primary Team Admitting:  Anil  Consultant Team Attending:  Kirsty  Consultant Team Fellow: Eve    Date of Consult: 2019    REASON FOR CONSULT:     Atypical pneumonia    SUBJECTIVE:   No acute events overnight, remains afebrile. Still with dry cough but improving. Denies chest pain or SOB.      OBJECTIVE:     Vital Signs:  BP  Min: 91/51  Max: 104/58  Temp  Av.3 °F (36.8 °C)  Min: 97.9 °F (36.6 °C)  Max: 98.7 °F (37.1 °C)  Pulse  Av.6  Min: 56  Max: 88  Resp  Av  Min: 16  Max: 20  SpO2  Av.4 %  Min: 92 %  Max: 95 %  I/O last 3 completed shifts:  In: 500 [IV Piggyback:500]  Out: 3 [Urine:3]    Physical Examination:  General: Alert and awake.  Comfortable. Well appearing  HENT:  NCAT; anicteric sclera with EOMi; OP clear with MMM  Cardio:  Regular rate and rhythm with normal S1 and S2; no murmurs  Resp:  Unlabored, occasional inspiratory wheezes in lung bases  Abdom:  Soft, NTND with normoactive bowel sounds  Extrem:  Warm and dry.  No edema  Skin:  No rashes, lesions, or color changes  Lymph:  No cervical supraclavicular, axillary LAD  Neuro:  AAOx3; cooperative and pleasant with no focal deficits      Laboratory:  Lab Results   Component Value Date    WBC 58.43 (HH) 2019    HGB 11.4 (L) 2019    HCT 35.7 (L) 2019    MCV 97 2019     (H) 2019     Lab Results   Component Value Date    CREATININE 0.8 2019    BUN 10 2019     2019    K 4.2 2019     2019    CO2 28 2019     Lab Results   Component Value Date    ALT 25 2019    AST 24 2019    ALKPHOS 80 2019    BILITOT 0.5 2019         ASSESSMENT:     Mr. Callejas is a 49 y.o. Homeless and HIV-negative male with hx incarceration (5 mos ago), tobacco dependence and polysubstance abuse presenting to ED with several days of fevers, chills, fatigue and cough with shortness of breath found to have  peribronchovascular predominant scattered GGo on chest CT concerning for atypical PNA vs inhalation pneumonitis     PLAN:      VRP pending, no respiratory culture performed. Flu, HIV neg. PCT 0.6.     Quant gold pending   AFB sputum 9/19, 9/20 negative stain, culture pending; need 1 more to rule out which is pending. low suspicion for TB   Continue CAP and anaerobic coverage (aspiration potential) pending BCx and RCx results   Recommend total 5 days abx for CAP (end date tomorrow 9/23)   Counseled on need to quit smoking and illicit drug use, patient optimistic in quitting. Primary team working with  for post-discharge resources on substance abuse.   Leukocytosis improved but still remarkably elevated, possible 2/2 infectious/inflammatory process however not responding to therapy as expected. Agree with heme consult.     We will sign off at this time.     Thank you for the consult. Please feel free to contact us with any questions or concerns regarding the care of this patient.      Asia Prado MD  Pulmonary / Critical Care Fellow, PGY4  #[phone]  09/22/2019  7:53 AM

## 2019-09-22 NOTE — HPI
Mr. Callejas is a 49 man with homelessness, polysubstance abuse (ETOH, cigarettes, cocaine, marijuana) who presented to ED 9/19 for several days of fatigue, fever, chills, SOB. CT chest showed multifocal pneumonia. HIV negative. Being ruled out for TB. We are consulted for leukocytosis. WBC 61.8, predominantly neutrophils but also has metamyelocytes and myelocytes, Hb 11.4, plt count 442. CT chest did not show splenomegaly. Patient thinks he lost weight but never weighted himself. Fever resolved since antibiotics started. No appetite loss, early satiety. Has not seen a doctor for a long time.

## 2019-09-22 NOTE — ASSESSMENT & PLAN NOTE
- Presumed infectious etiology of bilateral lower lobe abnormalities noted on CXR and greatly elevated WBCs. Differentials: viral, bacterial, risk factors for TB, HIV as well, polysubstance abuse with cocaine, marijuana, tobacco (all smoked). Higher suspicion for CAP with significant leukocytosis representing separate issue.  - TB quantiferon gold pending, AFB respiratory cultures in process.   - Continue ceftriaxone 1g IV q24hr, azithromycin 500mg IV q24hr, metronidazole 500mg IV q24hr to complete 5 day total course.  - Procal 0.6. HIV 1/2 negative.  - Appreciate pulmonology assistance.

## 2019-09-22 NOTE — PLAN OF CARE
Pt resting in bed. NAD, VSS on RA, AOx4. Pt not complaining of pain. PRN meds given for cough. Ambulatory with stand by assist. Urinal at the bedside. Tolerating IV abx. POC reviewed with pt. Bed low and locked. Personal items and call light within reach. Will continue to monitor.

## 2019-09-22 NOTE — PLAN OF CARE
MD requests information about pt Medicaid Status.    Information sent to [employer], [facility] office (Tia Samuel), awaiting callback during regular business hours for them to reach out to patient.

## 2019-09-22 NOTE — ASSESSMENT & PLAN NOTE
- Significant leukocytosis with myelocytes and metamyelocytes on peripheral smear.  - ?leukemia given persistence despite therapy for infectious causes. LDH elevated on admit to 746.  - Consult hematology/oncology for further evaluation and management recommendations; may require BMBx.

## 2019-09-22 NOTE — SUBJECTIVE & OBJECTIVE
Interval History: No acute events overnight. Feeling improved. No new concerns at this time.     Review of Systems   Constitutional: Negative for chills and fever.   Respiratory: Positive for cough. Negative for shortness of breath.    Cardiovascular: Negative for chest pain and palpitations.   Gastrointestinal: Negative for abdominal pain, nausea and vomiting.     Objective:     Vital Signs (Most Recent):  Temp: 97.9 °F (36.6 °C) (09/22/19 1223)  Pulse: 67 (09/22/19 1223)  Resp: 18 (09/22/19 1223)  BP: (!) 96/57 (09/22/19 1223)  SpO2: (!) 92 % (09/22/19 1223) Vital Signs (24h Range):  Temp:  [97.9 °F (36.6 °C)-98.7 °F (37.1 °C)] 97.9 °F (36.6 °C)  Pulse:  [56-78] 67  Resp:  [16-20] 18  SpO2:  [92 %-95 %] 92 %  BP: ()/(51-62) 96/57     Weight: 66.2 kg (145 lb 15.1 oz)  Body mass index is 23.56 kg/m².    Intake/Output Summary (Last 24 hours) at 9/22/2019 1555  Last data filed at 9/21/2019 1708  Gross per 24 hour   Intake 100 ml   Output 3 ml   Net 97 ml      Physical Exam   Constitutional: He is oriented to person, place, and time. He appears well-developed. No distress.   HENT:   Head: Normocephalic and atraumatic.   Eyes: Conjunctivae and EOM are normal.   Cardiovascular: Normal rate and intact distal pulses.   Pulmonary/Chest: Effort normal. No respiratory distress.   Mild bibasilar crackles.   Abdominal: Soft. There is no tenderness.   Musculoskeletal: Normal range of motion. He exhibits no edema.   Neurological: He is alert and oriented to person, place, and time.   Skin: Skin is warm and dry.   Nursing note and vitals reviewed.    Significant Labs:   CBC:  Recent Labs   Lab 09/20/19  0437 09/21/19  0352 09/22/19  0441   WBC 57.85* 58.43* 61.80*   HGB 11.4* 11.0* 11.4*   HCT 35.9* 34.1* 35.7*   * 396* 442*   GRAN 70.0 70.0 51.0   LYMPH 6.0*  CANCELED 10.0*  CANCELED 4.0*  CANCELED   MONO 5.0  CANCELED 7.0  CANCELED 7.0  CANCELED   EOS CANCELED CANCELED CANCELED   BASO CANCELED CANCELED  CANCELED      BMP:  Recent Labs   Lab 09/20/19  0438 09/21/19  0353 09/22/19  0441    139 140   K 3.7 4.0 4.2    106 105   CO2 25 25 28   BUN 10 11 10   CREATININE 0.8 0.8 0.8   GLU 70 91 79   CALCIUM 8.8 8.5* 9.0   MG 2.1 2.0 2.3   PHOS 3.2 3.0 4.0     Significant Imaging:   No new imaging this morning.

## 2019-09-22 NOTE — ASSESSMENT & PLAN NOTE
- iron labs support anemia of chronic disease. B12 elevated. Folate normal  - mild. Monitor. Transfuse for Hb<7

## 2019-09-22 NOTE — CONSULTS
[facility]  Hematology/Oncology  Consult Note    Patient Name: Gustabo Callejas  MRN: [id number]  Admission Date: 9/19/2019  Hospital Length of Stay: 3 days  Code Status: Full Code   Attending Provider: INOCENCIA Ma MD  Consulting Provider: Caridad Forrester MD  Primary Care Physician: Primary Doctor No  Principal Problem:Pneumonia of both lower lobes due to infectious organism    Inpatient consult to Hematology/Oncology  Consult performed by: Caridad Forrester MD  Consult ordered by: INOCENCIA Ma MD  Reason for consult: leukocytosis  Assessment/Recommendations: See consult note        Subjective:     HPI:  Mr. Callejas is a 49 man with homelessness, polysubstance abuse (ETOH, cigarettes, cocaine, marijuana) who presented to ED 9/19 for several days of fatigue, fever, chills, SOB. CT chest showed multifocal pneumonia. HIV negative. Being ruled out for TB. We are consulted for leukocytosis. WBC 61.8, predominantly neutrophils but also has metamyelocytes and myelocytes, Hb 11.4, plt count 442. CT chest did not show splenomegaly. Patient thinks he lost weight but never weighted himself. Fever resolved since antibiotics started. No appetite loss, early satiety. Has not seen a doctor for a long time.     Oncology Treatment Plan:   [No treatment plan]    Medications:  Continuous Infusions:  Scheduled Meds:   azithromycin  500 mg Intravenous Q24H    cefTRIAXone (ROCEPHIN) IVPB  1 g Intravenous Q24H    enoxaparin  40 mg Subcutaneous Daily    metronidazole  500 mg Intravenous Q8H     PRN Meds:acetaminophen, pneumoc 13-brendon conj-dip cr(PF), sodium chloride 0.9%     Review of patient's allergies indicates:   Allergen Reactions    Asa [aspirin] Swelling        History reviewed. No pertinent past medical history.  Past Surgical History:   Procedure Laterality Date    APPENDECTOMY       Family History     Problem Relation (Age of Onset)    Breast cancer Mother    Cancer Sister        Tobacco Use    Smoking  status: Current Some Day Smoker     Packs/day: 0.50   Substance and Sexual Activity    Alcohol use: Yes     Drinks per session: 1 or 2    Drug use: Yes     Types: Cocaine, Marijuana    Sexual activity: Yes     Partners: Female, Male       Review of Systems   Constitutional: Positive for chills, fatigue, fever and unexpected weight change. Negative for appetite change.   HENT: Negative for mouth sores, nosebleeds, tinnitus, trouble swallowing and voice change.    Eyes: Negative for pain, redness and visual disturbance.   Respiratory: Positive for shortness of breath. Negative for cough and wheezing.    Cardiovascular: Negative for chest pain, palpitations and leg swelling.   Gastrointestinal: Negative for abdominal distention, abdominal pain, blood in stool, constipation, diarrhea, nausea and vomiting.   Endocrine: Negative for polydipsia, polyphagia and polyuria.   Genitourinary: Negative for flank pain, frequency and hematuria.   Musculoskeletal: Negative for arthralgias, back pain, gait problem, joint swelling, myalgias, neck pain and neck stiffness.   Skin: Negative for color change, pallor, rash and wound.   Neurological: Negative for tremors, seizures, syncope, speech difficulty, weakness, light-headedness, numbness and headaches.   Hematological: Negative for adenopathy. Does not bruise/bleed easily.   Psychiatric/Behavioral: Negative for confusion, dysphoric mood and self-injury. The patient is not nervous/anxious.    All other systems reviewed and are negative.    Objective:     Vital Signs (Most Recent):  Temp: 98.1 °F (36.7 °C) (09/22/19 0726)  Pulse: 76 (09/22/19 0800)  Resp: 20 (09/22/19 0726)  BP: 102/62 (09/22/19 0726)  SpO2: (!) 93 % (09/22/19 0726) Vital Signs (24h Range):  Temp:  [97.9 °F (36.6 °C)-98.7 °F (37.1 °C)] 98.1 °F (36.7 °C)  Pulse:  [56-78] 76  Resp:  [16-20] 20  SpO2:  [92 %-95 %] 93 %  BP: ()/(51-62) 102/62     Weight: 66.2 kg (145 lb 15.1 oz)  Body mass index is 23.56  kg/m².  Body surface area is 1.76 meters squared.      Intake/Output Summary (Last 24 hours) at 9/22/2019 1015  Last data filed at 9/21/2019 1708  Gross per 24 hour   Intake 400 ml   Output 3 ml   Net 397 ml       Physical Exam   Constitutional: He is oriented to person, place, and time. He appears well-developed and well-nourished. He is cooperative. No distress.   HENT:   Head: Normocephalic and atraumatic.   Eyes: Pupils are equal, round, and reactive to light. Conjunctivae and EOM are normal. No scleral icterus.   Neck: Normal range of motion. Neck supple.   Cardiovascular: Normal rate and regular rhythm. Exam reveals no gallop and no friction rub.   No murmur heard.  Pulmonary/Chest: He has no wheezes.   Bilateral crackles   Abdominal: Soft. Bowel sounds are normal. He exhibits no distension and no mass. There is no hepatosplenomegaly. There is no tenderness.   Musculoskeletal: Normal range of motion. He exhibits no edema.   Lymphadenopathy:     He has no cervical adenopathy.   Neurological: He is alert and oriented to person, place, and time. He has normal strength. No cranial nerve deficit.   Skin: Skin is warm and dry. No rash noted. No erythema.   Psychiatric: He has a normal mood and affect. His behavior is normal. Judgment and thought content normal.   Vitals reviewed.      Significant Labs:   CBC:   Recent Labs   Lab 09/21/19  0352 09/22/19  0441   WBC 58.43* 61.80*   HGB 11.0* 11.4*   HCT 34.1* 35.7*   * 442*    and CMP:   Recent Labs   Lab 09/21/19  0353 09/22/19  0441    140   K 4.0 4.2    105   CO2 25 28   GLU 91 79   BUN 11 10   CREATININE 0.8 0.8   CALCIUM 8.5* 9.0   ANIONGAP 8 7*   EGFRNONAA >60 >60       Diagnostic Results:  I have reviewed all pertinent imaging results/findings within the past 24 hours.    Assessment/Plan:     Normocytic anemia  - iron labs support anemia of chronic disease. B12 elevated. Folate normal  - mild. Monitor. Transfuse for Hb<7    Thrombocytosis  -  can be seen in CML    Leukocytosis  - peripheral smear reviewed. +myelocytes and metamyelocytes  - anemia mild, Plt count elevated  - suspect CML. Albeit he does not have splenomegaly  - BCR/ABL ordered and pending  - discussed with patient that he may have CML, which is a chronic leukemia that can be treated with oral pills taken indefinitively  - discussed with Dr Jacobo. Please confirm with  re insurance status. If he does have medicaid, can transfer to [facility] to facilitate workup including bone marrow biopsy, as patient is homeless.         Thank you for your consult. I will follow-up with patient. Please contact us if you have any additional questions.    Caridad Forrester MD  Hematology/Oncology  [facility]

## 2019-09-22 NOTE — SUBJECTIVE & OBJECTIVE
Oncology Treatment Plan:   [No treatment plan]    Medications:  Continuous Infusions:  Scheduled Meds:   azithromycin  500 mg Intravenous Q24H    cefTRIAXone (ROCEPHIN) IVPB  1 g Intravenous Q24H    enoxaparin  40 mg Subcutaneous Daily    metronidazole  500 mg Intravenous Q8H     PRN Meds:acetaminophen, pneumoc 13-brendon conj-dip cr(PF), sodium chloride 0.9%     Review of patient's allergies indicates:   Allergen Reactions    Asa [aspirin] Swelling        History reviewed. No pertinent past medical history.  Past Surgical History:   Procedure Laterality Date    APPENDECTOMY       Family History     Problem Relation (Age of Onset)    Breast cancer Mother    Cancer Sister        Tobacco Use    Smoking status: Current Some Day Smoker     Packs/day: 0.50   Substance and Sexual Activity    Alcohol use: Yes     Drinks per session: 1 or 2    Drug use: Yes     Types: Cocaine, Marijuana    Sexual activity: Yes     Partners: Female, Male       Review of Systems   Constitutional: Positive for chills, fatigue, fever and unexpected weight change. Negative for appetite change.   HENT: Negative for mouth sores, nosebleeds, tinnitus, trouble swallowing and voice change.    Eyes: Negative for pain, redness and visual disturbance.   Respiratory: Positive for shortness of breath. Negative for cough and wheezing.    Cardiovascular: Negative for chest pain, palpitations and leg swelling.   Gastrointestinal: Negative for abdominal distention, abdominal pain, blood in stool, constipation, diarrhea, nausea and vomiting.   Endocrine: Negative for polydipsia, polyphagia and polyuria.   Genitourinary: Negative for flank pain, frequency and hematuria.   Musculoskeletal: Negative for arthralgias, back pain, gait problem, joint swelling, myalgias, neck pain and neck stiffness.   Skin: Negative for color change, pallor, rash and wound.   Neurological: Negative for tremors, seizures, syncope, speech difficulty, weakness, light-headedness,  numbness and headaches.   Hematological: Negative for adenopathy. Does not bruise/bleed easily.   Psychiatric/Behavioral: Negative for confusion, dysphoric mood and self-injury. The patient is not nervous/anxious.    All other systems reviewed and are negative.    Objective:     Vital Signs (Most Recent):  Temp: 98.1 °F (36.7 °C) (09/22/19 0726)  Pulse: 76 (09/22/19 0800)  Resp: 20 (09/22/19 0726)  BP: 102/62 (09/22/19 0726)  SpO2: (!) 93 % (09/22/19 0726) Vital Signs (24h Range):  Temp:  [97.9 °F (36.6 °C)-98.7 °F (37.1 °C)] 98.1 °F (36.7 °C)  Pulse:  [56-78] 76  Resp:  [16-20] 20  SpO2:  [92 %-95 %] 93 %  BP: ()/(51-62) 102/62     Weight: 66.2 kg (145 lb 15.1 oz)  Body mass index is 23.56 kg/m².  Body surface area is 1.76 meters squared.      Intake/Output Summary (Last 24 hours) at 9/22/2019 1015  Last data filed at 9/21/2019 1708  Gross per 24 hour   Intake 400 ml   Output 3 ml   Net 397 ml       Physical Exam   Constitutional: He is oriented to person, place, and time. He appears well-developed and well-nourished. He is cooperative. No distress.   HENT:   Head: Normocephalic and atraumatic.   Eyes: Pupils are equal, round, and reactive to light. Conjunctivae and EOM are normal. No scleral icterus.   Neck: Normal range of motion. Neck supple.   Cardiovascular: Normal rate and regular rhythm. Exam reveals no gallop and no friction rub.   No murmur heard.  Pulmonary/Chest: He has no wheezes.   Bilateral crackles   Abdominal: Soft. Bowel sounds are normal. He exhibits no distension and no mass. There is no hepatosplenomegaly. There is no tenderness.   Musculoskeletal: Normal range of motion. He exhibits no edema.   Lymphadenopathy:     He has no cervical adenopathy.   Neurological: He is alert and oriented to person, place, and time. He has normal strength. No cranial nerve deficit.   Skin: Skin is warm and dry. No rash noted. No erythema.   Psychiatric: He has a normal mood and affect. His behavior is  normal. Judgment and thought content normal.   Vitals reviewed.      Significant Labs:   CBC:   Recent Labs   Lab 09/21/19  0352 09/22/19  0441   WBC 58.43* 61.80*   HGB 11.0* 11.4*   HCT 34.1* 35.7*   * 442*    and CMP:   Recent Labs   Lab 09/21/19  0353 09/22/19  0441    140   K 4.0 4.2    105   CO2 25 28   GLU 91 79   BUN 11 10   CREATININE 0.8 0.8   CALCIUM 8.5* 9.0   ANIONGAP 8 7*   EGFRNONAA >60 >60       Diagnostic Results:  I have reviewed all pertinent imaging results/findings within the past 24 hours.

## 2019-09-22 NOTE — PLAN OF CARE
Problem: Fall Injury Risk  Goal: Absence of Fall and Fall-Related Injury    Intervention: Identify and Manage Contributors to Fall Injury Risk     09/21/19 2246   Manage Acute Allergic Reaction   Medication Review/Management medications reviewed   Identify and Manage Contributors to Fall Injury Risk   Self-Care Promotion independence encouraged;BADL personal routines maintained;BADL personal objects within reach     Intervention: Promote Injury-Free Environment     09/21/19 2246   Optimize Balance and Safe Activity   Safety Promotion/Fall Prevention assistive device/personal item within reach;Fall Risk reviewed with patient/family;lighting adjusted;medications reviewed;nonskid shoes/socks when out of bed;side rails raised x 2         Problem: Adult Inpatient Plan of Care  Goal: Plan of Care Review     09/21/19 2246   Plan of Care Review   Plan of Care Reviewed With patient   Progress improving       Problem: Infection  Goal: Infection Symptom Resolution    Intervention: Prevent or Manage Infection     09/21/19 2246   Manage Diarrhea   Isolation Precautions airborne precautions maintained   Prevent or Manage Infection   Fever Reduction/Comfort Measures lightweight bedding;lightweight clothing   Infection Management aseptic technique maintained

## 2019-09-23 LAB
ANION GAP SERPL CALC-SCNC: 6 MMOL/L (ref 8–16)
ANISOCYTOSIS BLD QL SMEAR: SLIGHT
BASOPHILS # BLD AUTO: ABNORMAL K/UL (ref 0–0.2)
BASOPHILS NFR BLD: 6 % (ref 0–1.9)
BUN SERPL-MCNC: 11 MG/DL (ref 6–20)
CALCIUM SERPL-MCNC: 9 MG/DL (ref 8.7–10.5)
CHLORIDE SERPL-SCNC: 105 MMOL/L (ref 95–110)
CO2 SERPL-SCNC: 27 MMOL/L (ref 23–29)
CREAT SERPL-MCNC: 0.9 MG/DL (ref 0.5–1.4)
DIFFERENTIAL METHOD: ABNORMAL
EOSINOPHIL # BLD AUTO: ABNORMAL K/UL (ref 0–0.5)
EOSINOPHIL NFR BLD: 3 % (ref 0–8)
ERYTHROCYTE [DISTWIDTH] IN BLOOD BY AUTOMATED COUNT: 14.7 % (ref 11.5–14.5)
EST. GFR  (AFRICAN AMERICAN): >60 ML/MIN/1.73 M^2
EST. GFR  (NON AFRICAN AMERICAN): >60 ML/MIN/1.73 M^2
GIANT PLATELETS BLD QL SMEAR: PRESENT
GLUCOSE SERPL-MCNC: 93 MG/DL (ref 70–110)
HAV IGM SERPL QL IA: NEGATIVE
HBV CORE IGM SERPL QL IA: NEGATIVE
HBV SURFACE AG SERPL QL IA: NEGATIVE
HCT VFR BLD AUTO: 36.1 % (ref 40–54)
HCV AB SERPL QL IA: NEGATIVE
HGB BLD-MCNC: 11.5 G/DL (ref 14–18)
IMM GRANULOCYTES # BLD AUTO: ABNORMAL K/UL (ref 0–0.04)
IMM GRANULOCYTES NFR BLD AUTO: ABNORMAL % (ref 0–0.5)
LYMPHOCYTES # BLD AUTO: ABNORMAL K/UL (ref 1–4.8)
LYMPHOCYTES NFR BLD: 13 % (ref 18–48)
M TB IFN-G CD4+ BCKGRND COR BLD-ACNC: -0.01 IU/ML
MAGNESIUM SERPL-MCNC: 2.3 MG/DL (ref 1.6–2.6)
MCH RBC QN AUTO: 30.7 PG (ref 27–31)
MCHC RBC AUTO-ENTMCNC: 31.9 G/DL (ref 32–36)
MCV RBC AUTO: 96 FL (ref 82–98)
METAMYELOCYTES NFR BLD MANUAL: 6 %
MITOGEN IGNF BCKGRD COR BLD-ACNC: 9.33 IU/ML
MITOGEN IGNF BCKGRD COR BLD-ACNC: NEGATIVE [IU]/ML
MONOCYTES # BLD AUTO: ABNORMAL K/UL (ref 0.3–1)
MONOCYTES NFR BLD: 4 % (ref 4–15)
MYELOCYTES NFR BLD MANUAL: 10 %
NEUTROPHILS NFR BLD: 50 % (ref 38–73)
NEUTS BAND NFR BLD MANUAL: 8 %
NIL: 0.03 IU/ML
NRBC BLD-RTO: 7 /100 WBC
PHOSPHATE SERPL-MCNC: 3.5 MG/DL (ref 2.7–4.5)
PLATELET # BLD AUTO: 467 K/UL (ref 150–350)
PLATELET BLD QL SMEAR: ABNORMAL
PMV BLD AUTO: 10.4 FL (ref 9.2–12.9)
POIKILOCYTOSIS BLD QL SMEAR: SLIGHT
POLYCHROMASIA BLD QL SMEAR: ABNORMAL
POTASSIUM SERPL-SCNC: 4.4 MMOL/L (ref 3.5–5.1)
RBC # BLD AUTO: 3.75 M/UL (ref 4.6–6.2)
SMUDGE CELLS BLD QL SMEAR: PRESENT
SODIUM SERPL-SCNC: 138 MMOL/L (ref 136–145)
TARGETS BLD QL SMEAR: ABNORMAL
TB2 - NIL: 0 IU/ML
WBC # BLD AUTO: 62.31 K/UL (ref 3.9–12.7)

## 2019-09-23 PROCEDURE — 99232 PR SUBSEQUENT HOSPITAL CARE,LEVL II: ICD-10-PCS | Mod: ,,, | Performed by: INTERNAL MEDICINE

## 2019-09-23 PROCEDURE — 84100 ASSAY OF PHOSPHORUS: CPT

## 2019-09-23 PROCEDURE — 25000003 PHARM REV CODE 250: Performed by: INTERNAL MEDICINE

## 2019-09-23 PROCEDURE — 63600175 PHARM REV CODE 636 W HCPCS: Performed by: INTERNAL MEDICINE

## 2019-09-23 PROCEDURE — 85027 COMPLETE CBC AUTOMATED: CPT

## 2019-09-23 PROCEDURE — 11000001 HC ACUTE MED/SURG PRIVATE ROOM

## 2019-09-23 PROCEDURE — 80048 BASIC METABOLIC PNL TOTAL CA: CPT

## 2019-09-23 PROCEDURE — 36415 COLL VENOUS BLD VENIPUNCTURE: CPT

## 2019-09-23 PROCEDURE — 85007 BL SMEAR W/DIFF WBC COUNT: CPT

## 2019-09-23 PROCEDURE — 83735 ASSAY OF MAGNESIUM: CPT

## 2019-09-23 PROCEDURE — 99232 SBSQ HOSP IP/OBS MODERATE 35: CPT | Mod: ,,, | Performed by: INTERNAL MEDICINE

## 2019-09-23 PROCEDURE — S0030 INJECTION, METRONIDAZOLE: HCPCS | Performed by: INTERNAL MEDICINE

## 2019-09-23 PROCEDURE — 94761 N-INVAS EAR/PLS OXIMETRY MLT: CPT

## 2019-09-23 RX ADMIN — METRONIDAZOLE 500 MG: 500 INJECTION, SOLUTION INTRAVENOUS at 03:09

## 2019-09-23 RX ADMIN — ENOXAPARIN SODIUM 40 MG: 100 INJECTION SUBCUTANEOUS at 05:09

## 2019-09-23 RX ADMIN — CEFTRIAXONE 1 G: 1 INJECTION, SOLUTION INTRAVENOUS at 12:09

## 2019-09-23 RX ADMIN — AZITHROMYCIN MONOHYDRATE 500 MG: 500 INJECTION, POWDER, LYOPHILIZED, FOR SOLUTION INTRAVENOUS at 01:09

## 2019-09-23 RX ADMIN — METRONIDAZOLE 500 MG: 500 INJECTION, SOLUTION INTRAVENOUS at 09:09

## 2019-09-23 RX ADMIN — BENZONATATE 100 MG: 100 CAPSULE ORAL at 09:09

## 2019-09-23 NOTE — SUBJECTIVE & OBJECTIVE
Interval History: No acute events overnight. No new concerns at this time.     Review of Systems   Constitutional: Negative for chills and fever.   Respiratory: Positive for cough. Negative for shortness of breath.    Cardiovascular: Negative for chest pain and palpitations.   Gastrointestinal: Negative for abdominal pain, nausea and vomiting.     Objective:     Vital Signs (Most Recent):  Temp: 99 °F (37.2 °C) (09/23/19 1212)  Pulse: 71 (09/23/19 1212)  Resp: 18 (09/23/19 1212)  BP: (!) 101/59 (09/23/19 1212)  SpO2: (!) 94 % (09/23/19 1212) Vital Signs (24h Range):  Temp:  [97.7 °F (36.5 °C)-99.4 °F (37.4 °C)] 99 °F (37.2 °C)  Pulse:  [62-90] 71  Resp:  [16-18] 18  SpO2:  [92 %-100 %] 94 %  BP: ()/(51-59) 101/59     Weight: 66.2 kg (145 lb 15.1 oz)  Body mass index is 23.56 kg/m².    Intake/Output Summary (Last 24 hours) at 9/23/2019 1333  Last data filed at 9/22/2019 1800  Gross per 24 hour   Intake 480 ml   Output --   Net 480 ml      Physical Exam   Constitutional: He is oriented to person, place, and time. He appears well-developed. No distress.   HENT:   Head: Normocephalic and atraumatic.   Eyes: Conjunctivae and EOM are normal.   Cardiovascular: Normal rate and intact distal pulses.   Pulmonary/Chest: Effort normal. No respiratory distress.   Mild bibasilar crackles.   Abdominal: Soft. There is no tenderness.   Musculoskeletal: Normal range of motion. He exhibits no edema.   Neurological: He is alert and oriented to person, place, and time.   Skin: Skin is warm and dry.   Nursing note and vitals reviewed.    Significant Labs:   CBC:  Recent Labs   Lab 09/21/19  0352 09/22/19  0441 09/23/19  0528   WBC 58.43* 61.80* 62.31*   HGB 11.0* 11.4* 11.5*   HCT 34.1* 35.7* 36.1*   * 442* 467*   GRAN 70.0 51.0 50.0   LYMPH 10.0*  CANCELED 4.0*  CANCELED 13.0*  CANCELED   MONO 7.0  CANCELED 7.0  CANCELED 4.0  CANCELED   EOS CANCELED CANCELED CANCELED   BASO CANCELED CANCELED CANCELED      BMP:  Recent  Labs   Lab 09/21/19  0353 09/22/19  0441 09/23/19  0528    140 138   K 4.0 4.2 4.4    105 105   CO2 25 28 27   BUN 11 10 11   CREATININE 0.8 0.8 0.9   GLU 91 79 93   CALCIUM 8.5* 9.0 9.0   MG 2.0 2.3 2.3   PHOS 3.0 4.0 3.5     Significant Imaging:   No new imaging this morning.

## 2019-09-23 NOTE — PROGRESS NOTES
LARRY f/u with Marizol at the [facility] [phone], has an available bed but need information about pt.      LARRY faxed medical records, insurance card and facesheet to [facility] [phone].      LARRY called [facility], again, and spoke to Donna w/ detox unit.  Donna stated that pt may already detoxed.  But pt can come to detox unit and they will do a drug screen and if pt doesn't need detox then they would refer him to the residential unit.  If not residential bed available then they will help pt find treatment.  Per Donna, pt  has to be at the facility by 2pm at [address]     LARRY met with pt and informed of conversation with [facility] and [facility], pt stated that if he can't go to [facility] that he would like to go to [facility] and let them try an get him in their or a program.  Pt reported that he left his bike there.    LARRY f/u with [facility], per Marizol pt's insurance is inactive, hasn't been active since 4/2018, unable to accept pt.

## 2019-09-23 NOTE — PROGRESS NOTES
LARRY called [facility] [phone] regarding inpt substance abuse treatment for pt.  LARRY spoke to Chin and she requested medical records and stated they can accept pt and need to be at their facility at 9am in the morning.      LARRY faxed referral form and medical records to [facility] [phone].

## 2019-09-23 NOTE — ASSESSMENT & PLAN NOTE
- Significant leukocytosis with myelocytes and metamyelocytes on peripheral smear.  - Concern for CML given persistent elevation and differential.   - BCR/ABL pending.  - Appreciate hematology/oncology assistance. Investigating insurance status.

## 2019-09-23 NOTE — ASSESSMENT & PLAN NOTE
- Presumed infectious etiology of bilateral lower lobe abnormalities noted on CXR and greatly elevated WBCs. Differentials: viral, bacterial, risk factors for TB, HIV as well, polysubstance abuse with cocaine, marijuana, tobacco (all smoked). Higher suspicion for CAP with significant leukocytosis representing separate issue.  - TB quantiferon gold pending, AFB respiratory cultures in process.   - Completing ceftriaxone 1g IV q24hr, azithromycin 500mg IV q24hr, metronidazole 500mg IV q24hr today.  - Procal 0.6. HIV 1/2 negative.   - Appreciate pulmonology assistance.

## 2019-09-23 NOTE — PROGRESS NOTES
"[facility] Hospital Medicine  Progress Note    Patient Name: Gustabo Callejas  MRN: [id number]  Patient Class: IP- Inpatient   Admission Date: 9/19/2019  Length of Stay: 4 days  Attending Physician: INOCENCIA Ma MD  Primary Care Provider: Primary Doctor No        Subjective:     Principal Problem:Pneumonia of both lower lobes due to infectious organism    HPI:  Mr. Callejas is a 49/M with pertinent history of homelessness, polysubstance abuse who presented to ED 09/19 with a several day history of fatigue, fever, chills, and shortness of breath. He reports that he has been feeling intermittently warm but has not measured his temperature; has also noted feeling "wheezy" and "like I'm not breathing properly." He does not recall specific night sweats other than a recent night spent at the [facility], where he had night sweats, but felt that the temperature was just elevated in the building. He states that he was attempting to be admitted to [facility] to enroll in rehabilitation but had significant fever prior and subsequently presented to the hospital.    He denies a personal history of tuberculosis or HIV, but does report long-term homelessness and prior imprisonments. He reported that he had been given a TB skin test and was due to be read Wednesday, but had not been officially read. Past sexual partners include men and women, but with only one partner in the last six months. He is unsure of any recent weight loss. He reports smoking 8-10 cigarettes a day "when I can get them," marijuana "when I can get it," and cocaine "as often as I can, most days." He states he drinks, but irregularly, with one to two beers per occasion.    Overview/Hospital Course:  After admission Mr. Callejas was started on ceftriaxone and azithromycin, and TB quantiferon and AFBs were ordered for further evaluation. Pulmonology was consulted and antibiotics extended to metronidazole IV for potential aspiration as " well. Leukocytosis was slow to improve, and given his peripheral smear findings with myelocytes and metamyelocytes hematology/oncology was consulted.    Interval History: No acute events overnight. No new concerns at this time.     Review of Systems   Constitutional: Negative for chills and fever.   Respiratory: Positive for cough. Negative for shortness of breath.    Cardiovascular: Negative for chest pain and palpitations.   Gastrointestinal: Negative for abdominal pain, nausea and vomiting.     Objective:     Vital Signs (Most Recent):  Temp: 99 °F (37.2 °C) (09/23/19 1212)  Pulse: 71 (09/23/19 1212)  Resp: 18 (09/23/19 1212)  BP: (!) 101/59 (09/23/19 1212)  SpO2: (!) 94 % (09/23/19 1212) Vital Signs (24h Range):  Temp:  [97.7 °F (36.5 °C)-99.4 °F (37.4 °C)] 99 °F (37.2 °C)  Pulse:  [62-90] 71  Resp:  [16-18] 18  SpO2:  [92 %-100 %] 94 %  BP: ()/(51-59) 101/59     Weight: 66.2 kg (145 lb 15.1 oz)  Body mass index is 23.56 kg/m².    Intake/Output Summary (Last 24 hours) at 9/23/2019 1333  Last data filed at 9/22/2019 1800  Gross per 24 hour   Intake 480 ml   Output --   Net 480 ml      Physical Exam   Constitutional: He is oriented to person, place, and time. He appears well-developed. No distress.   HENT:   Head: Normocephalic and atraumatic.   Eyes: Conjunctivae and EOM are normal.   Cardiovascular: Normal rate and intact distal pulses.   Pulmonary/Chest: Effort normal. No respiratory distress.   Mild bibasilar crackles.   Abdominal: Soft. There is no tenderness.   Musculoskeletal: Normal range of motion. He exhibits no edema.   Neurological: He is alert and oriented to person, place, and time.   Skin: Skin is warm and dry.   Nursing note and vitals reviewed.    Significant Labs:   CBC:  Recent Labs   Lab 09/21/19  0352 09/22/19  0441 09/23/19  0528   WBC 58.43* 61.80* 62.31*   HGB 11.0* 11.4* 11.5*   HCT 34.1* 35.7* 36.1*   * 442* 467*   GRAN 70.0 51.0 50.0   LYMPH 10.0*  CANCELED 4.0*  CANCELED  13.0*  CANCELED   MONO 7.0  CANCELED 7.0  CANCELED 4.0  CANCELED   EOS CANCELED CANCELED CANCELED   BASO CANCELED CANCELED CANCELED      BMP:  Recent Labs   Lab 09/21/19  0353 09/22/19  0441 09/23/19  0528    140 138   K 4.0 4.2 4.4    105 105   CO2 25 28 27   BUN 11 10 11   CREATININE 0.8 0.8 0.9   GLU 91 79 93   CALCIUM 8.5* 9.0 9.0   MG 2.0 2.3 2.3   PHOS 3.0 4.0 3.5     Significant Imaging:   No new imaging this morning.      Assessment/Plan:      * Pneumonia of both lower lobes due to infectious organism  - Presumed infectious etiology of bilateral lower lobe abnormalities noted on CXR and greatly elevated WBCs. Differentials: viral, bacterial, risk factors for TB, HIV as well, polysubstance abuse with cocaine, marijuana, tobacco (all smoked). Higher suspicion for CAP with significant leukocytosis representing separate issue.  - TB quantiferon gold pending, AFB respiratory cultures in process.   - Completing ceftriaxone 1g IV q24hr, azithromycin 500mg IV q24hr, metronidazole 500mg IV q24hr today.  - Procal 0.6. HIV 1/2 negative.   - Appreciate pulmonology assistance.    Normocytic anemia  - Normocytic anemia with no prior labs for comparison.  - Iron studies most consistent with anemia of chronic disease.  - As above.    Leukocytosis  - Significant leukocytosis with myelocytes and metamyelocytes on peripheral smear.  - Concern for CML given persistent elevation and differential.   - BCR/ABL pending.  - Appreciate hematology/oncology assistance. Investigating insurance status.    Homelessness  - Social work consult for discharge planning.    Polysubstance abuse  - Polysubstance abuse with tobacco, marijuana, and cocaine.  - Cessation counseling; is interested in cessation and was attempting to enter [facility] for detox.    Tobacco abuse  - Cessation counseling.  - Declines nicotine patch.    VTE Risk Mitigation (From admission, onward)         Ordered     enoxaparin injection 40 mg  Daily       09/19/19 1801     IP VTE HIGH RISK PATIENT  Once      09/19/19 1801              SOY Ma MD  Department of Hospital Medicine   [facility]

## 2019-09-24 VITALS
RESPIRATION RATE: 18 BRPM | SYSTOLIC BLOOD PRESSURE: 93 MMHG | WEIGHT: 145.94 LBS | DIASTOLIC BLOOD PRESSURE: 53 MMHG | HEART RATE: 64 BPM | HEIGHT: 66 IN | BODY MASS INDEX: 23.46 KG/M2 | TEMPERATURE: 98 F | OXYGEN SATURATION: 96 %

## 2019-09-24 LAB
ANION GAP SERPL CALC-SCNC: 6 MMOL/L (ref 8–16)
BACTERIA BLD CULT: NORMAL
BACTERIA BLD CULT: NORMAL
BUN SERPL-MCNC: 12 MG/DL (ref 6–20)
CALCIUM SERPL-MCNC: 8.9 MG/DL (ref 8.7–10.5)
CHLORIDE SERPL-SCNC: 103 MMOL/L (ref 95–110)
CO2 SERPL-SCNC: 28 MMOL/L (ref 23–29)
CREAT SERPL-MCNC: 0.8 MG/DL (ref 0.5–1.4)
EST. GFR  (AFRICAN AMERICAN): >60 ML/MIN/1.73 M^2
EST. GFR  (NON AFRICAN AMERICAN): >60 ML/MIN/1.73 M^2
GLUCOSE SERPL-MCNC: 95 MG/DL (ref 70–110)
MAGNESIUM SERPL-MCNC: 2.1 MG/DL (ref 1.6–2.6)
PHOSPHATE SERPL-MCNC: 3.3 MG/DL (ref 2.7–4.5)
POTASSIUM SERPL-SCNC: 4.3 MMOL/L (ref 3.5–5.1)
SODIUM SERPL-SCNC: 137 MMOL/L (ref 136–145)

## 2019-09-24 PROCEDURE — 99238 PR HOSPITAL DISCHARGE DAY,<30 MIN: ICD-10-PCS | Mod: ,,, | Performed by: INTERNAL MEDICINE

## 2019-09-24 PROCEDURE — 84100 ASSAY OF PHOSPHORUS: CPT

## 2019-09-24 PROCEDURE — 85027 COMPLETE CBC AUTOMATED: CPT

## 2019-09-24 PROCEDURE — 99238 HOSP IP/OBS DSCHRG MGMT 30/<: CPT | Mod: ,,, | Performed by: INTERNAL MEDICINE

## 2019-09-24 PROCEDURE — 85007 BL SMEAR W/DIFF WBC COUNT: CPT

## 2019-09-24 PROCEDURE — 90472 IMMUNIZATION ADMIN EACH ADD: CPT | Performed by: INTERNAL MEDICINE

## 2019-09-24 PROCEDURE — 90670 PCV13 VACCINE IM: CPT | Performed by: INTERNAL MEDICINE

## 2019-09-24 PROCEDURE — 36415 COLL VENOUS BLD VENIPUNCTURE: CPT

## 2019-09-24 PROCEDURE — 90471 IMMUNIZATION ADMIN: CPT | Performed by: INTERNAL MEDICINE

## 2019-09-24 PROCEDURE — 63600175 PHARM REV CODE 636 W HCPCS: Performed by: INTERNAL MEDICINE

## 2019-09-24 PROCEDURE — 83735 ASSAY OF MAGNESIUM: CPT

## 2019-09-24 PROCEDURE — 85060 BLOOD SMEAR INTERPRETATION: CPT | Mod: ,,, | Performed by: PATHOLOGY

## 2019-09-24 PROCEDURE — 90686 IIV4 VACC NO PRSV 0.5 ML IM: CPT | Performed by: INTERNAL MEDICINE

## 2019-09-24 PROCEDURE — 80048 BASIC METABOLIC PNL TOTAL CA: CPT

## 2019-09-24 PROCEDURE — 85060 PATHOLOGIST REVIEW: ICD-10-PCS | Mod: ,,, | Performed by: PATHOLOGY

## 2019-09-24 RX ADMIN — INFLUENZA VIRUS VACCINE 0.5 ML: 15; 15; 15; 15 SUSPENSION INTRAMUSCULAR at 09:09

## 2019-09-24 RX ADMIN — PNEUMOCOCCAL 13-VALENT CONJUGATE VACCINE 0.5 ML: 2.2; 2.2; 2.2; 2.2; 2.2; 4.4; 2.2; 2.2; 2.2; 2.2; 2.2; 2.2; 2.2 INJECTION, SUSPENSION INTRAMUSCULAR at 09:09

## 2019-09-24 NOTE — PROGRESS NOTES
AVS given and explained to pt. Pt verbalized understanding. IV removed without complications. Pt brought to 2nd floor Rocio narvaez to meet cab.

## 2019-09-24 NOTE — PROGRESS NOTES
LARRY received call from BLAKE Rocha and informed that Dr. Merida has discharged pt.  LARRY informed nurse that pt can transport to [facility] via taxi and can obtain taxi voucher from house supervisor and gave [facility]'s address, [address] [place] [address], to nurse.    LARRY called [facility], [phone], option 2 to schedule appt with Hemo/Oncology, spoke to Saloni and told that nurse has to review medical records first and will consult with MD and then will call pt with appt.  LARRY informed that pt doesn't have a phone and Saloni stated pt can call by  Friday, 9/27/19 for the appt time/date.      LARRY faxed ambulatory referral, facesheet (w/ phone # to [facility]) and medical records to [phone].      LARRY placed the above instructions on AVS for pt to f/u with [facility].    LARRY met with pt at bedside, BLKAE Rocha present and informed pt to call on Friday to the Cancer Center for appt time/date and pointed the instruction out to pt on AVS.

## 2019-09-24 NOTE — PLAN OF CARE
Pt resting in bed. NAD, VSS on RA, AOx4. Pt not complaining of pain. Ambulatory with stand by assist. Plan to DC tomorrow. POC reviewed with pt. Bed low and locked. Personal items and call light within reach. Will continue to monitor.

## 2019-09-24 NOTE — PLAN OF CARE
Pt discharged to [facility] for substance abuse treatment.     09/24/19 1045   Final Note   Assessment Type Final Discharge Note   Anticipated Discharge Disposition Rehab   Hospital Follow Up  Appt(s) scheduled? Yes   Discharge plans and expectations educations in teach back method with documentation complete? No

## 2019-09-24 NOTE — DISCHARGE SUMMARY
"[facility] Hospital Medicine  Discharge Summary      Patient Name: Gustabo Callejas  MRN: [id number]  Admission Date: 9/19/2019  Hospital Length of Stay: 5 days  Discharge Date and Time:  09/24/2019 9:17 AM  Attending Physician: Tia Merida MD   Discharging Provider: Tia Merida MD  Primary Care Provider: Primary Doctor No      HPI:   Mr. Callejas is a 49/M with pertinent history of homelessness, polysubstance abuse who presented to ED 09/19 with a several day history of fatigue, fever, chills, and shortness of breath. He reports that he has been feeling intermittently warm but has not measured his temperature; has also noted feeling "wheezy" and "like I'm not breathing properly." He does not recall specific night sweats other than a recent night spent at the [facility], where he had night sweats, but felt that the temperature was just elevated in the building. He states that he was attempting to be admitted to [facility] to enroll in rehabilitation but had significant fever prior and subsequently presented to the hospital.    He denies a personal history of tuberculosis or HIV, but does report long-term homelessness and prior imprisonments. He reported that he had been given a TB skin test and was due to be read Wednesday, but had not been officially read. Past sexual partners include men and women, but with only one partner in the last six months. He is unsure of any recent weight loss. He reports smoking 8-10 cigarettes a day "when I can get them," marijuana "when I can get it," and cocaine "as often as I can, most days." He states he drinks, but irregularly, with one to two beers per occasion.    * No surgery found *      Hospital Course:   After admission Mr. Callejas was started on ceftriaxone and azithromycin, and TB quantiferon and AFBs were ordered for further evaluation. Pulmonology was consulted and antibiotics extended to metronidazole IV for potential aspiration as well. " Leukocytosis was slow to improve, and given his peripheral smear findings with myelocytes and metamyelocytes hematology/oncology was consulted. He was referred to Hem/Onc at [facility] for further work-up. He completed course of antibiotics. He is stable and ready for discharge to [facility] today.     Consults:   Consults (From admission, onward)        Status Ordering Provider     Inpatient consult to Hematology/Oncology  Once     Provider:  Caridad Forrester MD    Completed INOCENCIA DHILLON     Inpatient consult to Pulmonary Critical Care  Once     Provider:  Maximo Sotelo MD    Completed INOCENCIA DHILLON     Inpatient consult to Social Work  Once     Provider:  (Not yet assigned)    Completed INOCENCIA DHILLON          No new Assessment & Plan notes have been filed under this hospital service since the last note was generated.  Service: Hospital Medicine    Final Active Diagnoses:    Diagnosis Date Noted POA    PRINCIPAL PROBLEM:  Pneumonia of both lower lobes due to infectious organism [J18.1] 09/19/2019 Yes    Thrombocytosis [D47.3] 09/22/2019 Yes    Tobacco abuse [Z72.0] 09/19/2019 Yes     Chronic    Polysubstance abuse [F19.10] 09/19/2019 Yes     Chronic    Homelessness [Z59.0] 09/19/2019 Not Applicable    Leukocytosis [D72.829] 09/19/2019 Yes    Normocytic anemia [D64.9] 09/19/2019 Yes      Problems Resolved During this Admission:       Discharged Condition: good    Disposition: Home or Self Care    Follow Up:  Follow-up Information     [facility] Hem/Onc.    Why:  as scheduled               Patient Instructions:      Ambulatory Referral to Hematology / Oncology   Referral Priority: Routine Referral Type: Consultation   Referral Reason: Specialty Services Required   Requested Specialty: Hematology and Oncology   Number of Visits Requested: 1     Diet Adult Regular     Activity as tolerated       Significant Diagnostic Studies: Labs:   BMP:   Recent Labs   Lab 09/23/19  0528 09/24/19  0356   GLU 93 95    137    K 4.4 4.3    103   CO2 27 28   BUN 11 12   CREATININE 0.9 0.8   CALCIUM 9.0 8.9   MG 2.3 2.1   , CBC   Recent Labs   Lab 09/23/19  0528 09/24/19  0357   WBC 62.31* 69.13*   HGB 11.5* 11.6*   HCT 36.1* 36.5*   * 482*    and All labs within the past 24 hours have been reviewed    Pending Diagnostic Studies:     None         Medications:  Reconciled Home Medications:      Medication List      You have not been prescribed any medications.         Indwelling Lines/Drains at time of discharge:   Lines/Drains/Airways     None                 Time spent on the discharge of patient: 30 minutes  Patient was seen and examined on the date of discharge and determined to be suitable for discharge.         Tia Merida MD  Department of Hospital Medicine  [facility]

## 2019-09-25 LAB
ENTEROVIRUS: NOT DETECTED
HUMAN BOCAVIRUS: NOT DETECTED
HUMAN CORONAVIRUS, COMMON COLD VIRUS: NOT DETECTED
INFLUENZA A - H1N1-09: NOT DETECTED
PARAINFLUENZA: NOT DETECTED
RVP - ADENOVIRUS: NOT DETECTED
RVP - HUMAN METAPNEUMOVIRUS (HMPV): NOT DETECTED
RVP - INFLUENZA A: NOT DETECTED
RVP - INFLUENZA B: NOT DETECTED
RVP - RESPIRATORY SYNCTIAL VIRUS (RSV) A: NOT DETECTED
RVP - RESPIRATORY VIRAL PANEL, SOURCE: NORMAL
RVP - RHINOVIRUS: NOT DETECTED

## 2019-09-26 LAB
DIAGNOSTIC BCR/ABL 1 RESULT: NORMAL
NARRATIVE DIAGNOSTIC REPORT-IMP: NORMAL
SPECIMEN TYPE, BCR/ABL: NORMAL

## 2019-09-27 LAB
ANISOCYTOSIS BLD QL SMEAR: SLIGHT
BASOPHILS NFR BLD: 13 % (ref 0–1.9)
DACRYOCYTES BLD QL SMEAR: ABNORMAL
DIFFERENTIAL METHOD: ABNORMAL
EOSINOPHIL NFR BLD: 2 % (ref 0–8)
ERYTHROCYTE [DISTWIDTH] IN BLOOD BY AUTOMATED COUNT: 14.7 % (ref 11.5–14.5)
GIANT PLATELETS BLD QL SMEAR: PRESENT
HCT VFR BLD AUTO: 36.5 % (ref 40–54)
HGB BLD-MCNC: 11.6 G/DL (ref 14–18)
IMM GRANULOCYTES # BLD AUTO: ABNORMAL K/UL (ref 0–0.04)
IMM GRANULOCYTES NFR BLD AUTO: ABNORMAL % (ref 0–0.5)
LYMPHOCYTES NFR BLD: 9 % (ref 18–48)
MCH RBC QN AUTO: 31.1 PG (ref 27–31)
MCHC RBC AUTO-ENTMCNC: 31.8 G/DL (ref 32–36)
MCV RBC AUTO: 98 FL (ref 82–98)
METAMYELOCYTES NFR BLD MANUAL: 5 %
MONOCYTES NFR BLD: 7 % (ref 4–15)
MYELOCYTES NFR BLD MANUAL: 3 %
NEUTROPHILS NFR BLD: 59 % (ref 38–73)
NEUTS BAND NFR BLD MANUAL: 2 %
NRBC BLD-RTO: 7 /100 WBC
PATH REV BLD -IMP: NORMAL
PLATELET # BLD AUTO: 482 K/UL (ref 150–350)
PLATELET BLD QL SMEAR: ABNORMAL
PMV BLD AUTO: 10.1 FL (ref 9.2–12.9)
POIKILOCYTOSIS BLD QL SMEAR: SLIGHT
POLYCHROMASIA BLD QL SMEAR: ABNORMAL
RBC # BLD AUTO: 3.73 M/UL (ref 4.6–6.2)
WBC # BLD AUTO: 69.13 K/UL (ref 3.9–12.7)
WBC NRBC COR # BLD: 51.97 K/UL (ref 3.9–12.7)

## 2019-11-15 ENCOUNTER — TELEPHONE (OUTPATIENT)
Dept: HEMATOLOGY/ONCOLOGY | Facility: CLINIC | Age: 49
End: 2019-11-15

## 2019-11-15 NOTE — TELEPHONE ENCOUNTER
----- Message from Silva Castellanos sent at 11/15/2019  3:01 PM CST -----  Contact: patient  Patient called to schedule an appointment specifically with heme/onc  And wishes to speak with a nurse regarding this matter.       he can be reached at [phone]    Thanks  KB

## 2019-11-21 LAB
ACID FAST MOD KINY STN SPEC: NORMAL
MYCOBACTERIUM SPEC QL CULT: NORMAL

## 2019-11-22 LAB
ACID FAST MOD KINY STN SPEC: NORMAL
ACID FAST MOD KINY STN SPEC: NORMAL
MYCOBACTERIUM SPEC QL CULT: NORMAL
MYCOBACTERIUM SPEC QL CULT: NORMAL

## 2019-11-23 LAB
ACID FAST MOD KINY STN SPEC: NORMAL
MYCOBACTERIUM SPEC QL CULT: NORMAL

## 2019-12-16 ENCOUNTER — OFFICE VISIT (OUTPATIENT)
Dept: OPTOMETRY | Facility: CLINIC | Age: 49
End: 2019-12-16
Payer: MEDICAID

## 2019-12-16 DIAGNOSIS — H31.011 MACULA SCAR OF POSTERIOR POLE OF RIGHT EYE: Primary | ICD-10-CM

## 2019-12-16 DIAGNOSIS — H52.4 PRESBYOPIA OF BOTH EYES: ICD-10-CM

## 2019-12-16 PROCEDURE — 99999 PR PBB SHADOW E&M-EST. PATIENT-LVL II: ICD-10-PCS | Mod: PBBFAC,,, | Performed by: OPTOMETRIST

## 2019-12-16 PROCEDURE — 99999 PR PBB SHADOW E&M-EST. PATIENT-LVL II: CPT | Mod: PBBFAC,,, | Performed by: OPTOMETRIST

## 2019-12-16 PROCEDURE — 92004 PR EYE EXAM, NEW PATIENT,COMPREHESV: ICD-10-PCS | Mod: S$PBB,,, | Performed by: OPTOMETRIST

## 2019-12-16 PROCEDURE — 92004 COMPRE OPH EXAM NEW PT 1/>: CPT | Mod: S$PBB,,, | Performed by: OPTOMETRIST

## 2019-12-16 PROCEDURE — 92015 DETERMINE REFRACTIVE STATE: CPT | Mod: ,,, | Performed by: OPTOMETRIST

## 2019-12-16 PROCEDURE — 99212 OFFICE O/P EST SF 10 MIN: CPT | Mod: PBBFAC,PO | Performed by: OPTOMETRIST

## 2019-12-16 PROCEDURE — 92015 PR REFRACTION: ICD-10-PCS | Mod: ,,, | Performed by: OPTOMETRIST
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
## 2020-09-10 ENCOUNTER — LAB VISIT (OUTPATIENT)
Dept: PRIMARY CARE CLINIC | Facility: OTHER | Age: 50
End: 2020-09-10
Payer: MEDICAID

## 2020-09-10 DIAGNOSIS — Z03.818 ENCOUNTER FOR OBSERVATION FOR SUSPECTED EXPOSURE TO OTHER BIOLOGICAL AGENTS RULED OUT: ICD-10-CM

## 2020-09-10 PROCEDURE — U0003 INFECTIOUS AGENT DETECTION BY NUCLEIC ACID (DNA OR RNA); SEVERE ACUTE RESPIRATORY SYNDROME CORONAVIRUS 2 (SARS-COV-2) (CORONAVIRUS DISEASE [COVID-19]), AMPLIFIED PROBE TECHNIQUE, MAKING USE OF HIGH THROUGHPUT TECHNOLOGIES AS DESCRIBED BY CMS-2020-01-R: HCPCS

## 2020-09-11 LAB — SARS-COV-2 RNA RESP QL NAA+PROBE: NOT DETECTED

## 2020-12-03 ENCOUNTER — TELEPHONE (OUTPATIENT)
Dept: EMERGENCY MEDICINE | Facility: OTHER | Age: 50
End: 2020-12-03

## 2020-12-03 ENCOUNTER — HOSPITAL ENCOUNTER (EMERGENCY)
Facility: OTHER | Age: 50
Discharge: HOME | End: 2020-12-03
Attending: EMERGENCY MEDICINE
Payer: MEDICAID

## 2020-12-03 ENCOUNTER — TELEPHONE (OUTPATIENT)
Dept: INTERNAL MEDICINE | Facility: CLINIC | Age: 50
End: 2020-12-03

## 2020-12-03 VITALS
TEMPERATURE: 98 F | WEIGHT: 150 LBS | HEIGHT: 66 IN | SYSTOLIC BLOOD PRESSURE: 127 MMHG | DIASTOLIC BLOOD PRESSURE: 85 MMHG | RESPIRATION RATE: 16 BRPM | BODY MASS INDEX: 24.11 KG/M2 | HEART RATE: 68 BPM | OXYGEN SATURATION: 97 %

## 2020-12-03 DIAGNOSIS — S61.431A GUNSHOT WOUND OF RIGHT HAND, INITIAL ENCOUNTER: Primary | ICD-10-CM

## 2020-12-03 DIAGNOSIS — S62.171B: ICD-10-CM
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
## 2020-12-03 DIAGNOSIS — S62.231B: ICD-10-CM

## 2020-12-03 LAB
ABO + RH BLD: NORMAL
ANION GAP SERPL CALC-SCNC: 11 MMOL/L (ref 8–16)
APTT BLDCRRT: 26.5 SEC (ref 21–32)
BASOPHILS # BLD AUTO: ABNORMAL K/UL (ref 0–0.2)
BASOPHILS NFR BLD: 6 % (ref 0–1.9)
BLD GP AB SCN CELLS X3 SERPL QL: NORMAL
BLOOD GROUP ANTIBODIES SERPL: NORMAL
BUN SERPL-MCNC: 16 MG/DL (ref 6–20)
CALCIUM SERPL-MCNC: 8.9 MG/DL (ref 8.7–10.5)
CHLORIDE SERPL-SCNC: 106 MMOL/L (ref 95–110)
CO2 SERPL-SCNC: 24 MMOL/L (ref 23–29)
CREAT SERPL-MCNC: 1.2 MG/DL (ref 0.5–1.4)
DIFFERENTIAL METHOD: ABNORMAL
EOSINOPHIL # BLD AUTO: ABNORMAL K/UL (ref 0–0.5)
EOSINOPHIL NFR BLD: 0 % (ref 0–8)
ERYTHROCYTE [DISTWIDTH] IN BLOOD BY AUTOMATED COUNT: 13.4 % (ref 11.5–14.5)
EST. GFR  (AFRICAN AMERICAN): >60 ML/MIN/1.73 M^2
EST. GFR  (NON AFRICAN AMERICAN): >60 ML/MIN/1.73 M^2
ETHANOL SERPL-MCNC: <10 MG/DL
GLUCOSE SERPL-MCNC: 73 MG/DL (ref 70–110)
HCT VFR BLD AUTO: 40.7 % (ref 40–54)
HGB BLD-MCNC: 13.4 G/DL (ref 14–18)
HYPOCHROMIA BLD QL SMEAR: ABNORMAL
IMM GRANULOCYTES # BLD AUTO: ABNORMAL K/UL (ref 0–0.04)
IMM GRANULOCYTES NFR BLD AUTO: ABNORMAL % (ref 0–0.5)
INR PPP: 1 (ref 0.8–1.2)
LYMPHOCYTES # BLD AUTO: ABNORMAL K/UL (ref 1–4.8)
LYMPHOCYTES NFR BLD: 12 % (ref 18–48)
MCH RBC QN AUTO: 31.2 PG (ref 27–31)
MCHC RBC AUTO-ENTMCNC: 32.9 G/DL (ref 32–36)
MCV RBC AUTO: 95 FL (ref 82–98)
METAMYELOCYTES NFR BLD MANUAL: 3 %
MONOCYTES # BLD AUTO: ABNORMAL K/UL (ref 0.3–1)
MONOCYTES NFR BLD: 5 % (ref 4–15)
MYELOCYTES NFR BLD MANUAL: 1 %
NEUTROPHILS NFR BLD: 73 % (ref 38–73)
NRBC BLD-RTO: 0 /100 WBC
PLATELET # BLD AUTO: 530 K/UL (ref 150–350)
PMV BLD AUTO: 10.8 FL (ref 9.2–12.9)
POTASSIUM SERPL-SCNC: 4.1 MMOL/L (ref 3.5–5.1)
PROTHROMBIN TIME: 10.9 SEC (ref 9–12.5)
RBC # BLD AUTO: 4.29 M/UL (ref 4.6–6.2)
SCHISTOCYTES BLD QL SMEAR: PRESENT
SODIUM SERPL-SCNC: 141 MMOL/L (ref 136–145)
STOMATOCYTES BLD QL SMEAR: PRESENT
WBC # BLD AUTO: 28.79 K/UL (ref 3.9–12.7)

## 2020-12-03 PROCEDURE — 90715 TDAP VACCINE 7 YRS/> IM: CPT | Performed by: EMERGENCY MEDICINE

## 2020-12-03 PROCEDURE — 85007 BL SMEAR W/DIFF WBC COUNT: CPT

## 2020-12-03 PROCEDURE — 96375 TX/PRO/DX INJ NEW DRUG ADDON: CPT

## 2020-12-03 PROCEDURE — 29125 APPL SHORT ARM SPLINT STATIC: CPT | Mod: RT

## 2020-12-03 PROCEDURE — 90471 IMMUNIZATION ADMIN: CPT | Performed by: EMERGENCY MEDICINE

## 2020-12-03 PROCEDURE — 25000003 PHARM REV CODE 250: Performed by: EMERGENCY MEDICINE

## 2020-12-03 PROCEDURE — 96374 THER/PROPH/DIAG INJ IV PUSH: CPT | Mod: 59

## 2020-12-03 PROCEDURE — 80320 DRUG SCREEN QUANTALCOHOLS: CPT

## 2020-12-03 PROCEDURE — 99284 EMERGENCY DEPT VISIT MOD MDM: CPT | Mod: 25

## 2020-12-03 PROCEDURE — 63600175 PHARM REV CODE 636 W HCPCS: Performed by: EMERGENCY MEDICINE

## 2020-12-03 PROCEDURE — 86870 RBC ANTIBODY IDENTIFICATION: CPT

## 2020-12-03 PROCEDURE — 85027 COMPLETE CBC AUTOMATED: CPT

## 2020-12-03 PROCEDURE — 86901 BLOOD TYPING SEROLOGIC RH(D): CPT

## 2020-12-03 PROCEDURE — 80048 BASIC METABOLIC PNL TOTAL CA: CPT

## 2020-12-03 PROCEDURE — 96376 TX/PRO/DX INJ SAME DRUG ADON: CPT

## 2020-12-03 PROCEDURE — 85610 PROTHROMBIN TIME: CPT

## 2020-12-03 PROCEDURE — 85730 THROMBOPLASTIN TIME PARTIAL: CPT

## 2020-12-03 RX ORDER — CEFAZOLIN SODIUM 1 G/3ML
1 INJECTION, POWDER, FOR SOLUTION INTRAMUSCULAR; INTRAVENOUS
Status: COMPLETED | OUTPATIENT
Start: 2020-12-03 | End: 2020-12-03

## 2020-12-03 RX ORDER — HYDROCODONE BITARTRATE AND ACETAMINOPHEN 5; 325 MG/1; MG/1
1 TABLET ORAL EVERY 8 HOURS PRN
Qty: 18 TABLET | Refills: 0 | Status: SHIPPED | OUTPATIENT
Start: 2020-12-03 | End: 2020-12-08

## 2020-12-03 RX ORDER — CEPHALEXIN 500 MG/1
500 CAPSULE ORAL 4 TIMES DAILY
Qty: 28 CAPSULE | Refills: 0 | Status: SHIPPED | OUTPATIENT
Start: 2020-12-03 | End: 2020-12-10

## 2020-12-03 RX ORDER — FENTANYL CITRATE 50 UG/ML
75 INJECTION, SOLUTION INTRAMUSCULAR; INTRAVENOUS
Status: COMPLETED | OUTPATIENT
Start: 2020-12-03 | End: 2020-12-03

## 2020-12-03 RX ORDER — OXYCODONE AND ACETAMINOPHEN 5; 325 MG/1; MG/1
1 TABLET ORAL
Status: COMPLETED | OUTPATIENT
Start: 2020-12-03 | End: 2020-12-03

## 2020-12-03 RX ORDER — LIDOCAINE HYDROCHLORIDE 20 MG/ML
5 INJECTION, SOLUTION INFILTRATION; PERINEURAL
Status: COMPLETED | OUTPATIENT
Start: 2020-12-03 | End: 2020-12-03

## 2020-12-03 RX ADMIN — FENTANYL CITRATE 75 MCG: 50 INJECTION, SOLUTION INTRAMUSCULAR; INTRAVENOUS at 05:12

## 2020-12-03 RX ADMIN — CEFAZOLIN 1 G: 330 INJECTION, POWDER, FOR SOLUTION INTRAMUSCULAR; INTRAVENOUS at 07:12

## 2020-12-03 RX ADMIN — CEFAZOLIN 1 G: 330 INJECTION, POWDER, FOR SOLUTION INTRAMUSCULAR; INTRAVENOUS at 06:12

## 2020-12-03 RX ADMIN — OXYCODONE HYDROCHLORIDE AND ACETAMINOPHEN 1 TABLET: 5; 325 TABLET ORAL at 09:12

## 2020-12-03 RX ADMIN — LIDOCAINE HYDROCHLORIDE 5 ML: 20 INJECTION, SOLUTION INFILTRATION; PERINEURAL at 06:12

## 2020-12-03 RX ADMIN — CLOSTRIDIUM TETANI TOXOID ANTIGEN (FORMALDEHYDE INACTIVATED), CORYNEBACTERIUM DIPHTHERIAE TOXOID ANTIGEN (FORMALDEHYDE INACTIVATED), BORDETELLA PERTUSSIS TOXOID ANTIGEN (GLUTARALDEHYDE INACTIVATED), BORDETELLA PERTUSSIS FILAMENTOUS HEMAGGLUTININ ANTIGEN (FORMALDEHYDE INACTIVATED), BORDETELLA PERTUSSIS PERTACTIN ANTIGEN, AND BORDETELLA PERTUSSIS FIMBRIAE 2/3 ANTIGEN 0.5 ML: 5; 2; 2.5; 5; 3; 5 INJECTION, SUSPENSION INTRAMUSCULAR at 05:12
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
## 2020-12-03 NOTE — ED NOTES
Provided pt with discharge paperwork and educated him on new medication. Pt still groggy from pain medication. Pt discharge is delayed until pt is more alert. Will continue to monitor.
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
## 2020-12-03 NOTE — ED NOTES
Pt AAOx4, ambulated in the room with a steady gait. Pt currently tearful due to pain. Pt requesting sling to help with pain and elevation. Will continue to monitor.
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
## 2020-12-21 ENCOUNTER — TELEPHONE (OUTPATIENT)
Dept: ORTHOPEDICS | Facility: CLINIC | Age: 50
End: 2020-12-21

## 2020-12-21 NOTE — TELEPHONE ENCOUNTER
Attempted to call patient back to schedule appointment, but his voicemail is not set-up.  ----- Message from Margo Zapien sent at 12/21/2020  1:03 PM CST -----  Regarding: FW: Patient Call Back  Dr. Escobedo was the consult on this transfer - please assist with f/u.   Thanks!  ----- Message -----  From: Leigha Dacosta  Sent: 12/21/2020  12:51 PM CST  To: [facility] Hand Clinical Staff  Subject: Patient Call Back                                Who Called: ADIN HOWELL [[id number]]    What is the request in detail: Would like a call back in regards to scheduling for a hospital follow up.    Can the clinic reply by  [facility]? No    Best Call Back Number: [phone]

## 2021-10-20 ENCOUNTER — HOSPITAL ENCOUNTER (INPATIENT)
Facility: OTHER | Age: 51
LOS: 5 days | Discharge: HOME | DRG: 392 | End: 2021-10-25
Attending: EMERGENCY MEDICINE | Admitting: HOSPITALIST
Payer: MEDICAID

## 2021-10-20 DIAGNOSIS — R09.02 HYPOXIA: ICD-10-CM

## 2021-10-20 DIAGNOSIS — Z72.0 TOBACCO ABUSE: Chronic | ICD-10-CM

## 2021-10-20 DIAGNOSIS — K52.9 PROCTOCOLITIS: ICD-10-CM

## 2021-10-20 DIAGNOSIS — A09 INFECTIOUS COLITIS: Primary | ICD-10-CM

## 2021-10-20 LAB
ABO + RH BLD: NORMAL
ALBUMIN SERPL BCP-MCNC: 3.3 G/DL (ref 3.5–5.2)
ALP SERPL-CCNC: 68 U/L (ref 55–135)
ALT SERPL W/O P-5'-P-CCNC: 24 U/L (ref 10–44)
ANION GAP SERPL CALC-SCNC: 13 MMOL/L (ref 8–16)
ANISOCYTOSIS BLD QL SMEAR: SLIGHT
AST SERPL-CCNC: 23 U/L (ref 10–40)
BASOPHILS # BLD AUTO: 0.11 K/UL (ref 0–0.2)
BASOPHILS # BLD AUTO: ABNORMAL K/UL (ref 0–0.2)
BASOPHILS NFR BLD: 0.8 % (ref 0–1.9)
BASOPHILS NFR BLD: 1 % (ref 0–1.9)
BILIRUB SERPL-MCNC: 0.9 MG/DL (ref 0.1–1)
BLD GP AB SCN CELLS X3 SERPL QL: NORMAL
BLOOD GROUP ANTIBODIES SERPL: NORMAL
BUN SERPL-MCNC: 23 MG/DL (ref 6–20)
CALCIUM SERPL-MCNC: 9.4 MG/DL (ref 8.7–10.5)
CHLORIDE SERPL-SCNC: 95 MMOL/L (ref 95–110)
CO2 SERPL-SCNC: 27 MMOL/L (ref 23–29)
CREAT SERPL-MCNC: 1.3 MG/DL (ref 0.5–1.4)
CTP QC/QA: YES
DIFFERENTIAL METHOD: ABNORMAL
DIFFERENTIAL METHOD: ABNORMAL
EOSINOPHIL # BLD AUTO: 0.1 K/UL (ref 0–0.5)
EOSINOPHIL # BLD AUTO: ABNORMAL K/UL (ref 0–0.5)
EOSINOPHIL NFR BLD: 0.6 % (ref 0–8)
EOSINOPHIL NFR BLD: 1 % (ref 0–8)
ERYTHROCYTE [DISTWIDTH] IN BLOOD BY AUTOMATED COUNT: 13.3 % (ref 11.5–14.5)
ERYTHROCYTE [DISTWIDTH] IN BLOOD BY AUTOMATED COUNT: 13.5 % (ref 11.5–14.5)
EST. GFR  (AFRICAN AMERICAN): >60 ML/MIN/1.73 M^2
EST. GFR  (NON AFRICAN AMERICAN): >60 ML/MIN/1.73 M^2
GLUCOSE SERPL-MCNC: 133 MG/DL (ref 70–110)
HCT VFR BLD AUTO: 47.5 % (ref 40–54)
HCT VFR BLD AUTO: 54.2 % (ref 40–54)
HCV AB SERPL QL IA: NEGATIVE
HGB BLD-MCNC: 16.1 G/DL (ref 14–18)
HGB BLD-MCNC: 19 G/DL (ref 14–18)
HIV 1+2 AB+HIV1 P24 AG SERPL QL IA: NEGATIVE
IMM GRANULOCYTES # BLD AUTO: 0.39 K/UL (ref 0–0.04)
IMM GRANULOCYTES # BLD AUTO: ABNORMAL K/UL (ref 0–0.04)
IMM GRANULOCYTES NFR BLD AUTO: 2.9 % (ref 0–0.5)
IMM GRANULOCYTES NFR BLD AUTO: ABNORMAL % (ref 0–0.5)
INR PPP: 1.1 (ref 0.8–1.2)
LYMPHOCYTES # BLD AUTO: 0.6 K/UL (ref 1–4.8)
LYMPHOCYTES # BLD AUTO: ABNORMAL K/UL (ref 1–4.8)
LYMPHOCYTES NFR BLD: 4.3 % (ref 18–48)
LYMPHOCYTES NFR BLD: 5 % (ref 18–48)
MCH RBC QN AUTO: 31.8 PG (ref 27–31)
MCH RBC QN AUTO: 33.3 PG (ref 27–31)
MCHC RBC AUTO-ENTMCNC: 33.9 G/DL (ref 32–36)
MCHC RBC AUTO-ENTMCNC: 35.1 G/DL (ref 32–36)
MCV RBC AUTO: 94 FL (ref 82–98)
MCV RBC AUTO: 95 FL (ref 82–98)
METAMYELOCYTES NFR BLD MANUAL: 5 %
MONOCYTES # BLD AUTO: 1.1 K/UL (ref 0.3–1)
MONOCYTES # BLD AUTO: ABNORMAL K/UL (ref 0.3–1)
MONOCYTES NFR BLD: 5 % (ref 4–15)
MONOCYTES NFR BLD: 8 % (ref 4–15)
NEUTROPHILS # BLD AUTO: 11.2 K/UL (ref 1.8–7.7)
NEUTROPHILS # BLD AUTO: ABNORMAL K/UL (ref 1.8–7.7)
NEUTROPHILS NFR BLD: 40 % (ref 38–73)
NEUTROPHILS NFR BLD: 83.4 % (ref 38–73)
NEUTS BAND NFR BLD MANUAL: 43 %
NRBC BLD-RTO: 0 /100 WBC
NRBC BLD-RTO: 0 /100 WBC
PLATELET # BLD AUTO: 373 K/UL (ref 150–450)
PLATELET # BLD AUTO: 404 K/UL (ref 150–450)
PLATELET BLD QL SMEAR: ABNORMAL
PLATELET BLD QL SMEAR: ABNORMAL
PMV BLD AUTO: 10 FL (ref 9.2–12.9)
PMV BLD AUTO: 10.6 FL (ref 9.2–12.9)
POLYCHROMASIA BLD QL SMEAR: ABNORMAL
POLYCHROMASIA BLD QL SMEAR: ABNORMAL
POTASSIUM SERPL-SCNC: 3.6 MMOL/L (ref 3.5–5.1)
PROT SERPL-MCNC: 6.8 G/DL (ref 6–8.4)
PROTHROMBIN TIME: 11.9 SEC (ref 9–12.5)
RBC # BLD AUTO: 5.07 M/UL (ref 4.6–6.2)
RBC # BLD AUTO: 5.7 M/UL (ref 4.6–6.2)
SARS-COV-2 RDRP RESP QL NAA+PROBE: NEGATIVE
SODIUM SERPL-SCNC: 135 MMOL/L (ref 136–145)
WBC # BLD AUTO: 13.48 K/UL (ref 3.9–12.7)
WBC # BLD AUTO: 16.24 K/UL (ref 3.9–12.7)

## 2021-10-20 PROCEDURE — G0378 HOSPITAL OBSERVATION PER HR: HCPCS

## 2021-10-20 PROCEDURE — 25500020 PHARM REV CODE 255: Performed by: EMERGENCY MEDICINE

## 2021-10-20 PROCEDURE — 63600175 PHARM REV CODE 636 W HCPCS: Performed by: NURSE PRACTITIONER

## 2021-10-20 PROCEDURE — 25000003 PHARM REV CODE 250: Performed by: NURSE PRACTITIONER

## 2021-10-20 PROCEDURE — 12000002 HC ACUTE/MED SURGE SEMI-PRIVATE ROOM

## 2021-10-20 PROCEDURE — 85610 PROTHROMBIN TIME: CPT | Performed by: EMERGENCY MEDICINE

## 2021-10-20 PROCEDURE — 80053 COMPREHEN METABOLIC PANEL: CPT | Performed by: EMERGENCY MEDICINE

## 2021-10-20 PROCEDURE — 85025 COMPLETE CBC W/AUTO DIFF WBC: CPT | Performed by: EMERGENCY MEDICINE

## 2021-10-20 PROCEDURE — 86870 RBC ANTIBODY IDENTIFICATION: CPT | Performed by: EMERGENCY MEDICINE

## 2021-10-20 PROCEDURE — 85027 COMPLETE CBC AUTOMATED: CPT | Performed by: NURSE PRACTITIONER

## 2021-10-20 PROCEDURE — 86900 BLOOD TYPING SEROLOGIC ABO: CPT | Performed by: EMERGENCY MEDICINE

## 2021-10-20 PROCEDURE — 96366 THER/PROPH/DIAG IV INF ADDON: CPT

## 2021-10-20 PROCEDURE — 86803 HEPATITIS C AB TEST: CPT | Performed by: EMERGENCY MEDICINE

## 2021-10-20 PROCEDURE — U0002 COVID-19 LAB TEST NON-CDC: HCPCS | Performed by: EMERGENCY MEDICINE

## 2021-10-20 PROCEDURE — 96374 THER/PROPH/DIAG INJ IV PUSH: CPT

## 2021-10-20 PROCEDURE — 96375 TX/PRO/DX INJ NEW DRUG ADDON: CPT

## 2021-10-20 PROCEDURE — 25000003 PHARM REV CODE 250: Performed by: INTERNAL MEDICINE

## 2021-10-20 PROCEDURE — 63600175 PHARM REV CODE 636 W HCPCS: Performed by: EMERGENCY MEDICINE

## 2021-10-20 PROCEDURE — 99285 EMERGENCY DEPT VISIT HI MDM: CPT | Mod: 25

## 2021-10-20 PROCEDURE — 63600175 PHARM REV CODE 636 W HCPCS: Performed by: INTERNAL MEDICINE

## 2021-10-20 PROCEDURE — 87389 HIV-1 AG W/HIV-1&-2 AB AG IA: CPT | Performed by: EMERGENCY MEDICINE

## 2021-10-20 PROCEDURE — 25000003 PHARM REV CODE 250: Performed by: EMERGENCY MEDICINE

## 2021-10-20 PROCEDURE — 96361 HYDRATE IV INFUSION ADD-ON: CPT

## 2021-10-20 PROCEDURE — 85007 BL SMEAR W/DIFF WBC COUNT: CPT | Mod: NCS | Performed by: NURSE PRACTITIONER

## 2021-10-20 RX ORDER — MORPHINE SULFATE 4 MG/ML
4 INJECTION, SOLUTION INTRAMUSCULAR; INTRAVENOUS EVERY 4 HOURS PRN
Status: DISCONTINUED | OUTPATIENT
Start: 2021-10-20 | End: 2021-10-25 | Stop reason: HOSPADM

## 2021-10-20 RX ORDER — DIPHENHYDRAMINE HYDROCHLORIDE 50 MG/ML
25 INJECTION INTRAMUSCULAR; INTRAVENOUS NIGHTLY PRN
Status: DISCONTINUED | OUTPATIENT
Start: 2021-10-20 | End: 2021-10-22

## 2021-10-20 RX ORDER — ONDANSETRON 2 MG/ML
4 INJECTION INTRAMUSCULAR; INTRAVENOUS EVERY 8 HOURS PRN
Status: DISCONTINUED | OUTPATIENT
Start: 2021-10-20 | End: 2021-10-22

## 2021-10-20 RX ORDER — HYOSCYAMINE SULFATE 0.5 MG/ML
0.5 INJECTION, SOLUTION SUBCUTANEOUS EVERY 6 HOURS PRN
Status: DISCONTINUED | OUTPATIENT
Start: 2021-10-20 | End: 2021-10-25 | Stop reason: HOSPADM

## 2021-10-20 RX ORDER — HYDROMORPHONE HYDROCHLORIDE 1 MG/ML
0.5 INJECTION, SOLUTION INTRAMUSCULAR; INTRAVENOUS; SUBCUTANEOUS EVERY 6 HOURS PRN
Status: DISCONTINUED | OUTPATIENT
Start: 2021-10-20 | End: 2021-10-25 | Stop reason: HOSPADM

## 2021-10-20 RX ORDER — TALC
6 POWDER (GRAM) TOPICAL NIGHTLY PRN
Status: DISCONTINUED | OUTPATIENT
Start: 2021-10-20 | End: 2021-10-25 | Stop reason: HOSPADM

## 2021-10-20 RX ORDER — MORPHINE SULFATE 4 MG/ML
4 INJECTION, SOLUTION INTRAMUSCULAR; INTRAVENOUS
Status: COMPLETED | OUTPATIENT
Start: 2021-10-20 | End: 2021-10-20

## 2021-10-20 RX ORDER — ACETAMINOPHEN 325 MG/1
650 TABLET ORAL EVERY 6 HOURS PRN
Status: DISCONTINUED | OUTPATIENT
Start: 2021-10-20 | End: 2021-10-22

## 2021-10-20 RX ORDER — SODIUM CHLORIDE 0.9 % (FLUSH) 0.9 %
10 SYRINGE (ML) INJECTION
Status: DISCONTINUED | OUTPATIENT
Start: 2021-10-20 | End: 2021-10-22

## 2021-10-20 RX ADMIN — SODIUM CHLORIDE 1000 ML: 0.9 INJECTION, SOLUTION INTRAVENOUS at 06:10

## 2021-10-20 RX ADMIN — PIPERACILLIN AND TAZOBACTAM 4.5 G: 4; .5 INJECTION, POWDER, LYOPHILIZED, FOR SOLUTION INTRAVENOUS; PARENTERAL at 12:10

## 2021-10-20 RX ADMIN — MORPHINE SULFATE 4 MG: 4 INJECTION INTRAVENOUS at 10:10

## 2021-10-20 RX ADMIN — PIPERACILLIN SODIUM AND TAZOBACTAM SODIUM 4.5 G: 4; .5 INJECTION, POWDER, FOR SOLUTION INTRAVENOUS at 07:10

## 2021-10-20 RX ADMIN — IOHEXOL 75 ML: 350 INJECTION, SOLUTION INTRAVENOUS at 11:10

## 2021-10-20 RX ADMIN — HYDROMORPHONE HYDROCHLORIDE 0.5 MG: 1 INJECTION, SOLUTION INTRAMUSCULAR; INTRAVENOUS; SUBCUTANEOUS at 05:10

## 2021-10-21 PROBLEM — C92.10 CHRONIC MYELOID LEUKEMIA: Status: ACTIVE | Noted: 2019-12-09

## 2021-10-21 LAB
ALBUMIN SERPL BCP-MCNC: 2.5 G/DL (ref 3.5–5.2)
ALP SERPL-CCNC: 55 U/L (ref 55–135)
ALT SERPL W/O P-5'-P-CCNC: 19 U/L (ref 10–44)
ANION GAP SERPL CALC-SCNC: 10 MMOL/L (ref 8–16)
ANISOCYTOSIS BLD QL SMEAR: SLIGHT
ANISOCYTOSIS BLD QL SMEAR: SLIGHT
AST SERPL-CCNC: 20 U/L (ref 10–40)
BASOPHILS # BLD AUTO: ABNORMAL K/UL (ref 0–0.2)
BASOPHILS # BLD AUTO: ABNORMAL K/UL (ref 0–0.2)
BASOPHILS NFR BLD: 0 % (ref 0–1.9)
BILIRUB SERPL-MCNC: 0.7 MG/DL (ref 0.1–1)
BUN SERPL-MCNC: 19 MG/DL (ref 6–20)
CALCIUM SERPL-MCNC: 8.2 MG/DL (ref 8.7–10.5)
CHLORIDE SERPL-SCNC: 97 MMOL/L (ref 95–110)
CO2 SERPL-SCNC: 25 MMOL/L (ref 23–29)
CREAT SERPL-MCNC: 1 MG/DL (ref 0.5–1.4)
DIFFERENTIAL METHOD: ABNORMAL
EOSINOPHIL # BLD AUTO: ABNORMAL K/UL (ref 0–0.5)
EOSINOPHIL # BLD AUTO: ABNORMAL K/UL (ref 0–0.5)
EOSINOPHIL NFR BLD: 0 % (ref 0–8)
ERYTHROCYTE [DISTWIDTH] IN BLOOD BY AUTOMATED COUNT: 13.2 % (ref 11.5–14.5)
ERYTHROCYTE [DISTWIDTH] IN BLOOD BY AUTOMATED COUNT: 13.3 % (ref 11.5–14.5)
EST. GFR  (AFRICAN AMERICAN): >60 ML/MIN/1.73 M^2
EST. GFR  (NON AFRICAN AMERICAN): >60 ML/MIN/1.73 M^2
GLUCOSE SERPL-MCNC: 115 MG/DL (ref 70–110)
HCT VFR BLD AUTO: 43.4 % (ref 40–54)
HCT VFR BLD AUTO: 43.6 % (ref 40–54)
HCT VFR BLD AUTO: 45.3 % (ref 40–54)
HCT VFR BLD AUTO: 47.5 % (ref 40–54)
HGB BLD-MCNC: 14.3 G/DL (ref 14–18)
HGB BLD-MCNC: 14.5 G/DL (ref 14–18)
HGB BLD-MCNC: 15.6 G/DL (ref 14–18)
HGB BLD-MCNC: 16.2 G/DL (ref 14–18)
IMM GRANULOCYTES # BLD AUTO: ABNORMAL K/UL (ref 0–0.04)
IMM GRANULOCYTES NFR BLD AUTO: ABNORMAL % (ref 0–0.5)
LYMPHOCYTES # BLD AUTO: ABNORMAL K/UL (ref 1–4.8)
LYMPHOCYTES # BLD AUTO: ABNORMAL K/UL (ref 1–4.8)
LYMPHOCYTES NFR BLD: 10 % (ref 18–48)
LYMPHOCYTES NFR BLD: 16 % (ref 18–48)
LYMPHOCYTES NFR BLD: 6 % (ref 18–48)
LYMPHOCYTES NFR BLD: 9 % (ref 18–48)
MCH RBC QN AUTO: 31.4 PG (ref 27–31)
MCH RBC QN AUTO: 31.6 PG (ref 27–31)
MCH RBC QN AUTO: 32.9 PG (ref 27–31)
MCH RBC QN AUTO: 33 PG (ref 27–31)
MCHC RBC AUTO-ENTMCNC: 32.9 G/DL (ref 32–36)
MCHC RBC AUTO-ENTMCNC: 33.3 G/DL (ref 32–36)
MCHC RBC AUTO-ENTMCNC: 34.1 G/DL (ref 32–36)
MCHC RBC AUTO-ENTMCNC: 34.4 G/DL (ref 32–36)
MCV RBC AUTO: 94 FL (ref 82–98)
MCV RBC AUTO: 96 FL (ref 82–98)
METAMYELOCYTES NFR BLD MANUAL: 1 %
METAMYELOCYTES NFR BLD MANUAL: 1 %
METAMYELOCYTES NFR BLD MANUAL: 3 %
MONOCYTES # BLD AUTO: ABNORMAL K/UL (ref 0.3–1)
MONOCYTES # BLD AUTO: ABNORMAL K/UL (ref 0.3–1)
MONOCYTES NFR BLD: 6 % (ref 4–15)
MONOCYTES NFR BLD: 7 % (ref 4–15)
MONOCYTES NFR BLD: 8 % (ref 4–15)
MONOCYTES NFR BLD: 9 % (ref 4–15)
MYELOCYTES NFR BLD MANUAL: 5 %
NEUTROPHILS # BLD AUTO: ABNORMAL K/UL (ref 1.8–7.7)
NEUTROPHILS # BLD AUTO: ABNORMAL K/UL (ref 1.8–7.7)
NEUTROPHILS NFR BLD: 61 % (ref 38–73)
NEUTROPHILS NFR BLD: 62 % (ref 38–73)
NEUTROPHILS NFR BLD: 70 % (ref 38–73)
NEUTROPHILS NFR BLD: 80 % (ref 38–73)
NEUTS BAND NFR BLD MANUAL: 1 %
NEUTS BAND NFR BLD MANUAL: 18 %
NEUTS BAND NFR BLD MANUAL: 22 %
NEUTS BAND NFR BLD MANUAL: 5 %
NRBC BLD-RTO: 0 /100 WBC
PLATELET # BLD AUTO: 252 K/UL (ref 150–450)
PLATELET # BLD AUTO: 315 K/UL (ref 150–450)
PLATELET # BLD AUTO: 367 K/UL (ref 150–450)
PLATELET # BLD AUTO: 380 K/UL (ref 150–450)
PLATELET BLD QL SMEAR: ABNORMAL
PMV BLD AUTO: 10.5 FL (ref 9.2–12.9)
PMV BLD AUTO: 10.7 FL (ref 9.2–12.9)
PMV BLD AUTO: 10.7 FL (ref 9.2–12.9)
PMV BLD AUTO: 11.1 FL (ref 9.2–12.9)
POIKILOCYTOSIS BLD QL SMEAR: SLIGHT
POLYCHROMASIA BLD QL SMEAR: ABNORMAL
POLYCHROMASIA BLD QL SMEAR: ABNORMAL
POTASSIUM SERPL-SCNC: 3.6 MMOL/L (ref 3.5–5.1)
PROT SERPL-MCNC: 5.3 G/DL (ref 6–8.4)
RBC # BLD AUTO: 4.53 M/UL (ref 4.6–6.2)
RBC # BLD AUTO: 4.62 M/UL (ref 4.6–6.2)
RBC # BLD AUTO: 4.73 M/UL (ref 4.6–6.2)
RBC # BLD AUTO: 4.93 M/UL (ref 4.6–6.2)
SODIUM SERPL-SCNC: 132 MMOL/L (ref 136–145)
WBC # BLD AUTO: 15.84 K/UL (ref 3.9–12.7)
WBC # BLD AUTO: 16.65 K/UL (ref 3.9–12.7)
WBC # BLD AUTO: 18.26 K/UL (ref 3.9–12.7)
WBC # BLD AUTO: 18.61 K/UL (ref 3.9–12.7)

## 2021-10-21 PROCEDURE — 11000001 HC ACUTE MED/SURG PRIVATE ROOM

## 2021-10-21 PROCEDURE — 80053 COMPREHEN METABOLIC PANEL: CPT | Performed by: NURSE PRACTITIONER

## 2021-10-21 PROCEDURE — 99220 PR INITIAL OBSERVATION CARE,LEVL III: CPT | Mod: 25,,, | Performed by: HOSPITALIST

## 2021-10-21 PROCEDURE — G0378 HOSPITAL OBSERVATION PER HR: HCPCS

## 2021-10-21 PROCEDURE — 63600175 PHARM REV CODE 636 W HCPCS: Performed by: NURSE PRACTITIONER

## 2021-10-21 PROCEDURE — 85007 BL SMEAR W/DIFF WBC COUNT: CPT | Mod: 91 | Performed by: NURSE PRACTITIONER

## 2021-10-21 PROCEDURE — 85027 COMPLETE CBC AUTOMATED: CPT | Mod: 91 | Performed by: NURSE PRACTITIONER

## 2021-10-21 PROCEDURE — 96375 TX/PRO/DX INJ NEW DRUG ADDON: CPT

## 2021-10-21 PROCEDURE — 36415 COLL VENOUS BLD VENIPUNCTURE: CPT | Performed by: NURSE PRACTITIONER

## 2021-10-21 PROCEDURE — 25000003 PHARM REV CODE 250: Performed by: NURSE PRACTITIONER

## 2021-10-21 PROCEDURE — 25000003 PHARM REV CODE 250: Performed by: INTERNAL MEDICINE

## 2021-10-21 PROCEDURE — 63600175 PHARM REV CODE 636 W HCPCS: Performed by: INTERNAL MEDICINE

## 2021-10-21 PROCEDURE — 96366 THER/PROPH/DIAG IV INF ADDON: CPT

## 2021-10-21 PROCEDURE — 99220 PR INITIAL OBSERVATION CARE,LEVL III: ICD-10-PCS | Mod: 25,,, | Performed by: HOSPITALIST

## 2021-10-21 RX ORDER — POLYETHYLENE GLYCOL 3350, SODIUM SULFATE ANHYDROUS, SODIUM BICARBONATE, SODIUM CHLORIDE, POTASSIUM CHLORIDE 236; 22.74; 6.74; 5.86; 2.97 G/4L; G/4L; G/4L; G/4L; G/4L
4000 POWDER, FOR SOLUTION ORAL ONCE
Status: COMPLETED | OUTPATIENT
Start: 2021-10-21 | End: 2021-10-21

## 2021-10-21 RX ADMIN — PIPERACILLIN SODIUM AND TAZOBACTAM SODIUM 4.5 G: 4; .5 INJECTION, POWDER, FOR SOLUTION INTRAVENOUS at 04:10

## 2021-10-21 RX ADMIN — PIPERACILLIN SODIUM AND TAZOBACTAM SODIUM 4.5 G: 4; .5 INJECTION, POWDER, FOR SOLUTION INTRAVENOUS at 08:10

## 2021-10-21 RX ADMIN — HYDROMORPHONE HYDROCHLORIDE 0.5 MG: 1 INJECTION, SOLUTION INTRAMUSCULAR; INTRAVENOUS; SUBCUTANEOUS at 04:10

## 2021-10-21 RX ADMIN — POLYETHYLENE GLYCOL 3350, SODIUM SULFATE ANHYDROUS, SODIUM BICARBONATE, SODIUM CHLORIDE, POTASSIUM CHLORIDE 4000 ML: 236; 22.74; 6.74; 5.86; 2.97 POWDER, FOR SOLUTION ORAL at 10:10

## 2021-10-21 RX ADMIN — PIPERACILLIN SODIUM AND TAZOBACTAM SODIUM 4.5 G: 4; .5 INJECTION, POWDER, FOR SOLUTION INTRAVENOUS at 12:10

## 2021-10-22 ENCOUNTER — ANESTHESIA (OUTPATIENT)
Dept: ENDOSCOPY | Facility: OTHER | Age: 51
DRG: 392 | End: 2021-10-22
Payer: MEDICAID

## 2021-10-22 ENCOUNTER — ANESTHESIA EVENT (OUTPATIENT)
Dept: ENDOSCOPY | Facility: OTHER | Age: 51
DRG: 392 | End: 2021-10-22
Payer: MEDICAID

## 2021-10-22 PROBLEM — A09 INFECTIOUS COLITIS: Status: ACTIVE | Noted: 2021-10-22

## 2021-10-22 LAB
BASOPHILS # BLD AUTO: ABNORMAL K/UL (ref 0–0.2)
BASOPHILS NFR BLD: 0 % (ref 0–1.9)
DIFFERENTIAL METHOD: ABNORMAL
EOSINOPHIL # BLD AUTO: ABNORMAL K/UL (ref 0–0.5)
EOSINOPHIL NFR BLD: 0 % (ref 0–8)
ERYTHROCYTE [DISTWIDTH] IN BLOOD BY AUTOMATED COUNT: 13.3 % (ref 11.5–14.5)
GIANT PLATELETS BLD QL SMEAR: PRESENT
HCT VFR BLD AUTO: 43.2 % (ref 40–54)
HGB BLD-MCNC: 14.4 G/DL (ref 14–18)
IMM GRANULOCYTES # BLD AUTO: ABNORMAL K/UL (ref 0–0.04)
IMM GRANULOCYTES NFR BLD AUTO: ABNORMAL % (ref 0–0.5)
LYMPHOCYTES # BLD AUTO: ABNORMAL K/UL (ref 1–4.8)
LYMPHOCYTES NFR BLD: 12 % (ref 18–48)
MCH RBC QN AUTO: 31.5 PG (ref 27–31)
MCHC RBC AUTO-ENTMCNC: 33.3 G/DL (ref 32–36)
MCV RBC AUTO: 95 FL (ref 82–98)
MONOCYTES # BLD AUTO: ABNORMAL K/UL (ref 0.3–1)
MONOCYTES NFR BLD: 9 % (ref 4–15)
MYELOCYTES NFR BLD MANUAL: 1 %
NEUTROPHILS # BLD AUTO: ABNORMAL K/UL (ref 1.8–7.7)
NEUTROPHILS NFR BLD: 74 % (ref 38–73)
NEUTS BAND NFR BLD MANUAL: 1 %
NRBC BLD-RTO: 0 /100 WBC
PLATELET # BLD AUTO: 379 K/UL (ref 150–450)
PLATELET BLD QL SMEAR: ABNORMAL
PMV BLD AUTO: 10.3 FL (ref 9.2–12.9)
PROMYELOCYTES NFR BLD MANUAL: 3 %
RBC # BLD AUTO: 4.57 M/UL (ref 4.6–6.2)
TOXIC GRANULES BLD QL SMEAR: PRESENT
WBC # BLD AUTO: 18.48 K/UL (ref 3.9–12.7)

## 2021-10-22 PROCEDURE — 63600175 PHARM REV CODE 636 W HCPCS: Performed by: INTERNAL MEDICINE

## 2021-10-22 PROCEDURE — 99233 SBSQ HOSP IP/OBS HIGH 50: CPT | Mod: ,,, | Performed by: HOSPITALIST

## 2021-10-22 PROCEDURE — 25000003 PHARM REV CODE 250: Performed by: HOSPITALIST

## 2021-10-22 PROCEDURE — 27000207 HC ISOLATION

## 2021-10-22 PROCEDURE — 25000003 PHARM REV CODE 250: Performed by: INTERNAL MEDICINE

## 2021-10-22 PROCEDURE — 37000009 HC ANESTHESIA EA ADD 15 MINS: Performed by: INTERNAL MEDICINE

## 2021-10-22 PROCEDURE — 85027 COMPLETE CBC AUTOMATED: CPT | Performed by: NURSE PRACTITIONER

## 2021-10-22 PROCEDURE — 63600175 PHARM REV CODE 636 W HCPCS: Performed by: NURSE PRACTITIONER

## 2021-10-22 PROCEDURE — 99233 PR SUBSEQUENT HOSPITAL CARE,LEVL III: ICD-10-PCS | Mod: ,,, | Performed by: HOSPITALIST

## 2021-10-22 PROCEDURE — 88342 IMHCHEM/IMCYTCHM 1ST ANTB: CPT | Performed by: PATHOLOGY

## 2021-10-22 PROCEDURE — 45380 COLONOSCOPY AND BIOPSY: CPT | Performed by: INTERNAL MEDICINE

## 2021-10-22 PROCEDURE — 85007 BL SMEAR W/DIFF WBC COUNT: CPT | Performed by: NURSE PRACTITIONER

## 2021-10-22 PROCEDURE — 63600175 PHARM REV CODE 636 W HCPCS: Performed by: HOSPITALIST

## 2021-10-22 PROCEDURE — 25000003 PHARM REV CODE 250: Performed by: STUDENT IN AN ORGANIZED HEALTH CARE EDUCATION/TRAINING PROGRAM

## 2021-10-22 PROCEDURE — 88342 IMHCHEM/IMCYTCHM 1ST ANTB: CPT | Mod: 26,,, | Performed by: PATHOLOGY

## 2021-10-22 PROCEDURE — 63600175 PHARM REV CODE 636 W HCPCS: Performed by: STUDENT IN AN ORGANIZED HEALTH CARE EDUCATION/TRAINING PROGRAM

## 2021-10-22 PROCEDURE — 36415 COLL VENOUS BLD VENIPUNCTURE: CPT | Performed by: NURSE PRACTITIONER

## 2021-10-22 PROCEDURE — 11000001 HC ACUTE MED/SURG PRIVATE ROOM

## 2021-10-22 PROCEDURE — 88305 TISSUE EXAM BY PATHOLOGIST: CPT | Performed by: PATHOLOGY

## 2021-10-22 PROCEDURE — 88305 TISSUE EXAM BY PATHOLOGIST: ICD-10-PCS | Mod: 26,,, | Performed by: PATHOLOGY

## 2021-10-22 PROCEDURE — 88305 TISSUE EXAM BY PATHOLOGIST: CPT | Mod: 26,,, | Performed by: PATHOLOGY

## 2021-10-22 PROCEDURE — 25000003 PHARM REV CODE 250: Performed by: NURSE PRACTITIONER

## 2021-10-22 PROCEDURE — 88342 CHG IMMUNOCYTOCHEMISTRY: ICD-10-PCS | Mod: 26,,, | Performed by: PATHOLOGY

## 2021-10-22 PROCEDURE — 37000008 HC ANESTHESIA 1ST 15 MINUTES: Performed by: INTERNAL MEDICINE

## 2021-10-22 RX ORDER — SODIUM CHLORIDE, SODIUM LACTATE, POTASSIUM CHLORIDE, CALCIUM CHLORIDE 600; 310; 30; 20 MG/100ML; MG/100ML; MG/100ML; MG/100ML
INJECTION, SOLUTION INTRAVENOUS CONTINUOUS
Status: DISCONTINUED | OUTPATIENT
Start: 2021-10-22 | End: 2021-10-25 | Stop reason: HOSPADM

## 2021-10-22 RX ORDER — ACETAMINOPHEN 500 MG
1000 TABLET ORAL EVERY 8 HOURS PRN
Status: DISCONTINUED | OUTPATIENT
Start: 2021-10-22 | End: 2021-10-25 | Stop reason: HOSPADM

## 2021-10-22 RX ORDER — SODIUM CHLORIDE 0.9 % (FLUSH) 0.9 %
3 SYRINGE (ML) INJECTION
Status: DISCONTINUED | OUTPATIENT
Start: 2021-10-22 | End: 2021-10-22

## 2021-10-22 RX ORDER — PROPOFOL 10 MG/ML
VIAL (ML) INTRAVENOUS CONTINUOUS PRN
Status: DISCONTINUED | OUTPATIENT
Start: 2021-10-22 | End: 2021-10-22

## 2021-10-22 RX ORDER — LIDOCAINE HYDROCHLORIDE 20 MG/ML
INJECTION INTRAVENOUS
Status: DISCONTINUED | OUTPATIENT
Start: 2021-10-22 | End: 2021-10-22

## 2021-10-22 RX ORDER — ONDANSETRON 2 MG/ML
8 INJECTION INTRAMUSCULAR; INTRAVENOUS EVERY 8 HOURS PRN
Status: DISCONTINUED | OUTPATIENT
Start: 2021-10-22 | End: 2021-10-25 | Stop reason: HOSPADM

## 2021-10-22 RX ORDER — PROPOFOL 10 MG/ML
VIAL (ML) INTRAVENOUS
Status: DISCONTINUED | OUTPATIENT
Start: 2021-10-22 | End: 2021-10-22

## 2021-10-22 RX ADMIN — SODIUM CHLORIDE, SODIUM LACTATE, POTASSIUM CHLORIDE, AND CALCIUM CHLORIDE: .6; .31; .03; .02 INJECTION, SOLUTION INTRAVENOUS at 08:10

## 2021-10-22 RX ADMIN — ACETAMINOPHEN 1000 MG: 500 TABLET ORAL at 12:10

## 2021-10-22 RX ADMIN — PIPERACILLIN SODIUM AND TAZOBACTAM SODIUM 4.5 G: 4; .5 INJECTION, POWDER, FOR SOLUTION INTRAVENOUS at 12:10

## 2021-10-22 RX ADMIN — SODIUM CHLORIDE, SODIUM LACTATE, POTASSIUM CHLORIDE, AND CALCIUM CHLORIDE: .6; .31; .03; .02 INJECTION, SOLUTION INTRAVENOUS at 11:10

## 2021-10-22 RX ADMIN — GLYCOPYRROLATE 0.2 MG: 0.2 INJECTION, SOLUTION INTRAMUSCULAR; INTRAVITREAL at 08:10

## 2021-10-22 RX ADMIN — LIDOCAINE HYDROCHLORIDE 50 MG: 20 INJECTION, SOLUTION INTRAVENOUS at 08:10

## 2021-10-22 RX ADMIN — PIPERACILLIN SODIUM AND TAZOBACTAM SODIUM 4.5 G: 4; .5 INJECTION, POWDER, FOR SOLUTION INTRAVENOUS at 03:10

## 2021-10-22 RX ADMIN — PIPERACILLIN SODIUM AND TAZOBACTAM SODIUM 4.5 G: 4; .5 INJECTION, POWDER, FOR SOLUTION INTRAVENOUS at 08:10

## 2021-10-22 RX ADMIN — PROPOFOL 150 MCG/KG/MIN: 10 INJECTION, EMULSION INTRAVENOUS at 08:10

## 2021-10-22 RX ADMIN — PROPOFOL 120 MG: 10 INJECTION, EMULSION INTRAVENOUS at 08:10

## 2021-10-23 PROBLEM — A09 INFECTIOUS COLITIS: Status: ACTIVE | Noted: 2021-10-20

## 2021-10-23 LAB
ANION GAP SERPL CALC-SCNC: 9 MMOL/L (ref 8–16)
ANISOCYTOSIS BLD QL SMEAR: SLIGHT
BASOPHILS # BLD AUTO: ABNORMAL K/UL (ref 0–0.2)
BASOPHILS NFR BLD: 0 % (ref 0–1.9)
BUN SERPL-MCNC: 11 MG/DL (ref 6–20)
CALCIUM SERPL-MCNC: 8.2 MG/DL (ref 8.7–10.5)
CHLORIDE SERPL-SCNC: 102 MMOL/L (ref 95–110)
CO2 SERPL-SCNC: 26 MMOL/L (ref 23–29)
CREAT SERPL-MCNC: 1 MG/DL (ref 0.5–1.4)
DIFFERENTIAL METHOD: ABNORMAL
DOHLE BOD BLD QL SMEAR: PRESENT
EOSINOPHIL # BLD AUTO: ABNORMAL K/UL (ref 0–0.5)
EOSINOPHIL NFR BLD: 2 % (ref 0–8)
ERYTHROCYTE [DISTWIDTH] IN BLOOD BY AUTOMATED COUNT: 13.2 % (ref 11.5–14.5)
EST. GFR  (AFRICAN AMERICAN): >60 ML/MIN/1.73 M^2
EST. GFR  (NON AFRICAN AMERICAN): >60 ML/MIN/1.73 M^2
GIANT PLATELETS BLD QL SMEAR: PRESENT
GLUCOSE SERPL-MCNC: 100 MG/DL (ref 70–110)
HCT VFR BLD AUTO: 43.7 % (ref 40–54)
HGB BLD-MCNC: 15 G/DL (ref 14–18)
IMM GRANULOCYTES # BLD AUTO: ABNORMAL K/UL (ref 0–0.04)
IMM GRANULOCYTES NFR BLD AUTO: ABNORMAL % (ref 0–0.5)
LYMPHOCYTES # BLD AUTO: ABNORMAL K/UL (ref 1–4.8)
LYMPHOCYTES NFR BLD: 7 % (ref 18–48)
MAGNESIUM SERPL-MCNC: 2.3 MG/DL (ref 1.6–2.6)
MCH RBC QN AUTO: 33.3 PG (ref 27–31)
MCHC RBC AUTO-ENTMCNC: 34.3 G/DL (ref 32–36)
MCV RBC AUTO: 97 FL (ref 82–98)
METAMYELOCYTES NFR BLD MANUAL: 19 %
MONOCYTES # BLD AUTO: ABNORMAL K/UL (ref 0.3–1)
MONOCYTES NFR BLD: 5 % (ref 4–15)
MYELOCYTES NFR BLD MANUAL: 6 %
NEUTROPHILS NFR BLD: 56 % (ref 38–73)
NEUTS BAND NFR BLD MANUAL: 5 %
NRBC BLD-RTO: 0 /100 WBC
PHOSPHATE SERPL-MCNC: 2.7 MG/DL (ref 2.7–4.5)
PLATELET # BLD AUTO: 404 K/UL (ref 150–450)
PLATELET BLD QL SMEAR: ABNORMAL
PMV BLD AUTO: 10.5 FL (ref 9.2–12.9)
POIKILOCYTOSIS BLD QL SMEAR: SLIGHT
POLYCHROMASIA BLD QL SMEAR: ABNORMAL
POTASSIUM SERPL-SCNC: 3.7 MMOL/L (ref 3.5–5.1)
RBC # BLD AUTO: 4.51 M/UL (ref 4.6–6.2)
SMUDGE CELLS BLD QL SMEAR: PRESENT
SODIUM SERPL-SCNC: 137 MMOL/L (ref 136–145)
TOXIC GRANULES BLD QL SMEAR: PRESENT
WBC # BLD AUTO: 32.45 K/UL (ref 3.9–12.7)
WBC TOXIC VACUOLES BLD QL SMEAR: PRESENT

## 2021-10-23 PROCEDURE — 36415 COLL VENOUS BLD VENIPUNCTURE: CPT | Performed by: HOSPITALIST

## 2021-10-23 PROCEDURE — 85007 BL SMEAR W/DIFF WBC COUNT: CPT | Performed by: HOSPITALIST

## 2021-10-23 PROCEDURE — 83993 ASSAY FOR CALPROTECTIN FECAL: CPT | Performed by: INTERNAL MEDICINE

## 2021-10-23 PROCEDURE — 87449 NOS EACH ORGANISM AG IA: CPT | Performed by: HOSPITALIST

## 2021-10-23 PROCEDURE — 99233 PR SUBSEQUENT HOSPITAL CARE,LEVL III: ICD-10-PCS | Mod: ,,, | Performed by: HOSPITALIST

## 2021-10-23 PROCEDURE — 87324 CLOSTRIDIUM AG IA: CPT | Performed by: HOSPITALIST

## 2021-10-23 PROCEDURE — 83735 ASSAY OF MAGNESIUM: CPT | Performed by: HOSPITALIST

## 2021-10-23 PROCEDURE — 27000207 HC ISOLATION

## 2021-10-23 PROCEDURE — 63600175 PHARM REV CODE 636 W HCPCS: Performed by: HOSPITALIST

## 2021-10-23 PROCEDURE — 85027 COMPLETE CBC AUTOMATED: CPT | Performed by: HOSPITALIST

## 2021-10-23 PROCEDURE — 80048 BASIC METABOLIC PNL TOTAL CA: CPT | Performed by: HOSPITALIST

## 2021-10-23 PROCEDURE — 11000001 HC ACUTE MED/SURG PRIVATE ROOM

## 2021-10-23 PROCEDURE — 25000003 PHARM REV CODE 250: Performed by: NURSE PRACTITIONER

## 2021-10-23 PROCEDURE — 63600175 PHARM REV CODE 636 W HCPCS: Performed by: NURSE PRACTITIONER

## 2021-10-23 PROCEDURE — 84100 ASSAY OF PHOSPHORUS: CPT | Performed by: HOSPITALIST

## 2021-10-23 PROCEDURE — 99233 SBSQ HOSP IP/OBS HIGH 50: CPT | Mod: ,,, | Performed by: HOSPITALIST

## 2021-10-23 RX ADMIN — PIPERACILLIN SODIUM AND TAZOBACTAM SODIUM 4.5 G: 4; .5 INJECTION, POWDER, FOR SOLUTION INTRAVENOUS at 09:10

## 2021-10-23 RX ADMIN — PIPERACILLIN SODIUM AND TAZOBACTAM SODIUM 4.5 G: 4; .5 INJECTION, POWDER, FOR SOLUTION INTRAVENOUS at 03:10

## 2021-10-23 RX ADMIN — PIPERACILLIN SODIUM AND TAZOBACTAM SODIUM 4.5 G: 4; .5 INJECTION, POWDER, FOR SOLUTION INTRAVENOUS at 12:10

## 2021-10-23 RX ADMIN — SODIUM CHLORIDE, SODIUM LACTATE, POTASSIUM CHLORIDE, AND CALCIUM CHLORIDE: .6; .31; .03; .02 INJECTION, SOLUTION INTRAVENOUS at 09:10

## 2021-10-23 RX ADMIN — SODIUM CHLORIDE, SODIUM LACTATE, POTASSIUM CHLORIDE, AND CALCIUM CHLORIDE: .6; .31; .03; .02 INJECTION, SOLUTION INTRAVENOUS at 05:10

## 2021-10-24 LAB
ANION GAP SERPL CALC-SCNC: 9 MMOL/L (ref 8–16)
ANISOCYTOSIS BLD QL SMEAR: SLIGHT
BASOPHILS # BLD AUTO: ABNORMAL K/UL (ref 0–0.2)
BASOPHILS NFR BLD: 0 % (ref 0–1.9)
BUN SERPL-MCNC: 12 MG/DL (ref 6–20)
C DIFF GDH STL QL: NEGATIVE
C DIFF TOX A+B STL QL IA: NEGATIVE
CALCIUM SERPL-MCNC: 8.4 MG/DL (ref 8.7–10.5)
CHLORIDE SERPL-SCNC: 105 MMOL/L (ref 95–110)
CO2 SERPL-SCNC: 24 MMOL/L (ref 23–29)
CREAT SERPL-MCNC: 0.9 MG/DL (ref 0.5–1.4)
DIFFERENTIAL METHOD: ABNORMAL
EOSINOPHIL # BLD AUTO: ABNORMAL K/UL (ref 0–0.5)
EOSINOPHIL NFR BLD: 3 % (ref 0–8)
ERYTHROCYTE [DISTWIDTH] IN BLOOD BY AUTOMATED COUNT: 13.3 % (ref 11.5–14.5)
EST. GFR  (AFRICAN AMERICAN): >60 ML/MIN/1.73 M^2
EST. GFR  (NON AFRICAN AMERICAN): >60 ML/MIN/1.73 M^2
GLUCOSE SERPL-MCNC: 99 MG/DL (ref 70–110)
HCT VFR BLD AUTO: 42.1 % (ref 40–54)
HGB BLD-MCNC: 14.2 G/DL (ref 14–18)
IMM GRANULOCYTES # BLD AUTO: ABNORMAL K/UL (ref 0–0.04)
IMM GRANULOCYTES NFR BLD AUTO: ABNORMAL % (ref 0–0.5)
LYMPHOCYTES # BLD AUTO: ABNORMAL K/UL (ref 1–4.8)
LYMPHOCYTES NFR BLD: 10 % (ref 18–48)
MAGNESIUM SERPL-MCNC: 2 MG/DL (ref 1.6–2.6)
MCH RBC QN AUTO: 33 PG (ref 27–31)
MCHC RBC AUTO-ENTMCNC: 33.7 G/DL (ref 32–36)
MCV RBC AUTO: 98 FL (ref 82–98)
METAMYELOCYTES NFR BLD MANUAL: 1 %
MONOCYTES # BLD AUTO: ABNORMAL K/UL (ref 0.3–1)
MONOCYTES NFR BLD: 2 % (ref 4–15)
MYELOCYTES NFR BLD MANUAL: 12 %
NEUTROPHILS NFR BLD: 69 % (ref 38–73)
NRBC BLD-RTO: 0 /100 WBC
PHOSPHATE SERPL-MCNC: 2 MG/DL (ref 2.7–4.5)
PLATELET # BLD AUTO: 419 K/UL (ref 150–450)
PLATELET BLD QL SMEAR: ABNORMAL
PMV BLD AUTO: 10.9 FL (ref 9.2–12.9)
POIKILOCYTOSIS BLD QL SMEAR: SLIGHT
POLYCHROMASIA BLD QL SMEAR: ABNORMAL
POTASSIUM SERPL-SCNC: 3.7 MMOL/L (ref 3.5–5.1)
PROMYELOCYTES NFR BLD MANUAL: 3 %
RBC # BLD AUTO: 4.3 M/UL (ref 4.6–6.2)
SODIUM SERPL-SCNC: 138 MMOL/L (ref 136–145)
WBC # BLD AUTO: 48.75 K/UL (ref 3.9–12.7)

## 2021-10-24 PROCEDURE — 25000003 PHARM REV CODE 250: Performed by: HOSPITALIST

## 2021-10-24 PROCEDURE — 85007 BL SMEAR W/DIFF WBC COUNT: CPT | Performed by: HOSPITALIST

## 2021-10-24 PROCEDURE — 25000003 PHARM REV CODE 250: Performed by: INTERNAL MEDICINE

## 2021-10-24 PROCEDURE — 36415 COLL VENOUS BLD VENIPUNCTURE: CPT | Performed by: HOSPITALIST

## 2021-10-24 PROCEDURE — 11000001 HC ACUTE MED/SURG PRIVATE ROOM

## 2021-10-24 PROCEDURE — 25000003 PHARM REV CODE 250: Performed by: NURSE PRACTITIONER

## 2021-10-24 PROCEDURE — 63600175 PHARM REV CODE 636 W HCPCS: Performed by: INTERNAL MEDICINE

## 2021-10-24 PROCEDURE — 99233 SBSQ HOSP IP/OBS HIGH 50: CPT | Mod: ,,, | Performed by: HOSPITALIST

## 2021-10-24 PROCEDURE — 63600175 PHARM REV CODE 636 W HCPCS: Performed by: HOSPITALIST

## 2021-10-24 PROCEDURE — 80048 BASIC METABOLIC PNL TOTAL CA: CPT | Performed by: HOSPITALIST

## 2021-10-24 PROCEDURE — 83735 ASSAY OF MAGNESIUM: CPT | Performed by: HOSPITALIST

## 2021-10-24 PROCEDURE — 99233 PR SUBSEQUENT HOSPITAL CARE,LEVL III: ICD-10-PCS | Mod: ,,, | Performed by: HOSPITALIST

## 2021-10-24 PROCEDURE — 84100 ASSAY OF PHOSPHORUS: CPT | Performed by: HOSPITALIST

## 2021-10-24 PROCEDURE — 85027 COMPLETE CBC AUTOMATED: CPT | Performed by: HOSPITALIST

## 2021-10-24 PROCEDURE — 63600175 PHARM REV CODE 636 W HCPCS: Performed by: NURSE PRACTITIONER

## 2021-10-24 RX ORDER — POTASSIUM CHLORIDE 20 MEQ/1
40 TABLET, EXTENDED RELEASE ORAL ONCE
Status: COMPLETED | OUTPATIENT
Start: 2021-10-24 | End: 2021-10-24

## 2021-10-24 RX ADMIN — PIPERACILLIN SODIUM AND TAZOBACTAM SODIUM 4.5 G: 4; .5 INJECTION, POWDER, FOR SOLUTION INTRAVENOUS at 08:10

## 2021-10-24 RX ADMIN — SODIUM CHLORIDE, SODIUM LACTATE, POTASSIUM CHLORIDE, AND CALCIUM CHLORIDE: .6; .31; .03; .02 INJECTION, SOLUTION INTRAVENOUS at 02:10

## 2021-10-24 RX ADMIN — POTASSIUM CHLORIDE 40 MEQ: 1500 TABLET, EXTENDED RELEASE ORAL at 06:10

## 2021-10-24 RX ADMIN — SODIUM CHLORIDE, SODIUM LACTATE, POTASSIUM CHLORIDE, AND CALCIUM CHLORIDE: .6; .31; .03; .02 INJECTION, SOLUTION INTRAVENOUS at 09:10

## 2021-10-24 RX ADMIN — PIPERACILLIN SODIUM AND TAZOBACTAM SODIUM 4.5 G: 4; .5 INJECTION, POWDER, FOR SOLUTION INTRAVENOUS at 12:10

## 2021-10-24 RX ADMIN — PIPERACILLIN SODIUM AND TAZOBACTAM SODIUM 4.5 G: 4; .5 INJECTION, POWDER, FOR SOLUTION INTRAVENOUS at 05:10

## 2021-10-24 RX ADMIN — SODIUM CHLORIDE, SODIUM LACTATE, POTASSIUM CHLORIDE, AND CALCIUM CHLORIDE: .6; .31; .03; .02 INJECTION, SOLUTION INTRAVENOUS at 12:10

## 2021-10-25 VITALS
HEART RATE: 77 BPM | RESPIRATION RATE: 18 BRPM | WEIGHT: 131.38 LBS | OXYGEN SATURATION: 97 % | DIASTOLIC BLOOD PRESSURE: 61 MMHG | BODY MASS INDEX: 21.11 KG/M2 | HEIGHT: 66 IN | SYSTOLIC BLOOD PRESSURE: 110 MMHG | TEMPERATURE: 99 F

## 2021-10-25 LAB
ANION GAP SERPL CALC-SCNC: 7 MMOL/L (ref 8–16)
ANISOCYTOSIS BLD QL SMEAR: SLIGHT
BASOPHILS # BLD AUTO: 0.09 K/UL (ref 0–0.2)
BASOPHILS NFR BLD: 0.1 % (ref 0–1.9)
BUN SERPL-MCNC: 10 MG/DL (ref 6–20)
CALCIUM SERPL-MCNC: 8.6 MG/DL (ref 8.7–10.5)
CHLORIDE SERPL-SCNC: 107 MMOL/L (ref 95–110)
CO2 SERPL-SCNC: 24 MMOL/L (ref 23–29)
CREAT SERPL-MCNC: 0.8 MG/DL (ref 0.5–1.4)
DIFFERENTIAL METHOD: ABNORMAL
EOSINOPHIL # BLD AUTO: 1.6 K/UL (ref 0–0.5)
EOSINOPHIL NFR BLD: 2.4 % (ref 0–8)
ERYTHROCYTE [DISTWIDTH] IN BLOOD BY AUTOMATED COUNT: 13.8 % (ref 11.5–14.5)
EST. GFR  (AFRICAN AMERICAN): >60 ML/MIN/1.73 M^2
EST. GFR  (NON AFRICAN AMERICAN): >60 ML/MIN/1.73 M^2
GIANT PLATELETS BLD QL SMEAR: PRESENT
GLUCOSE SERPL-MCNC: 78 MG/DL (ref 70–110)
HCT VFR BLD AUTO: 40 % (ref 40–54)
HGB BLD-MCNC: 12.9 G/DL (ref 14–18)
IMM GRANULOCYTES # BLD AUTO: 32.4 K/UL (ref 0–0.04)
IMM GRANULOCYTES NFR BLD AUTO: 46.6 % (ref 0–0.5)
LYMPHOCYTES # BLD AUTO: 3.5 K/UL (ref 1–4.8)
LYMPHOCYTES NFR BLD: 4.9 % (ref 18–48)
MAGNESIUM SERPL-MCNC: 1.8 MG/DL (ref 1.6–2.6)
MCH RBC QN AUTO: 31.6 PG (ref 27–31)
MCHC RBC AUTO-ENTMCNC: 32.3 G/DL (ref 32–36)
MCV RBC AUTO: 98 FL (ref 82–98)
MONOCYTES # BLD AUTO: 2.4 K/UL (ref 0.3–1)
MONOCYTES NFR BLD: 3.4 % (ref 4–15)
NEUTROPHILS # BLD AUTO: 29.7 K/UL (ref 1.8–7.7)
NEUTROPHILS NFR BLD: 42.6 % (ref 38–73)
NRBC BLD-RTO: 0 /100 WBC
PATH REV BLD -IMP: NORMAL
PHOSPHATE SERPL-MCNC: 1.5 MG/DL (ref 2.7–4.5)
PLATELET # BLD AUTO: 399 K/UL (ref 150–450)
PLATELET BLD QL SMEAR: ABNORMAL
PMV BLD AUTO: 10 FL (ref 9.2–12.9)
POIKILOCYTOSIS BLD QL SMEAR: SLIGHT
POLYCHROMASIA BLD QL SMEAR: ABNORMAL
POTASSIUM SERPL-SCNC: 3.8 MMOL/L (ref 3.5–5.1)
RBC # BLD AUTO: 4.08 M/UL (ref 4.6–6.2)
SODIUM SERPL-SCNC: 138 MMOL/L (ref 136–145)
WBC # BLD AUTO: 69.7 K/UL (ref 3.9–12.7)

## 2021-10-25 PROCEDURE — 63600175 PHARM REV CODE 636 W HCPCS: Performed by: HOSPITALIST

## 2021-10-25 PROCEDURE — 85060 BLOOD SMEAR INTERPRETATION: CPT | Mod: ,,, | Performed by: PATHOLOGY

## 2021-10-25 PROCEDURE — 85027 COMPLETE CBC AUTOMATED: CPT | Performed by: HOSPITALIST

## 2021-10-25 PROCEDURE — 83735 ASSAY OF MAGNESIUM: CPT | Performed by: HOSPITALIST

## 2021-10-25 PROCEDURE — 36415 COLL VENOUS BLD VENIPUNCTURE: CPT | Performed by: HOSPITALIST

## 2021-10-25 PROCEDURE — 99239 HOSP IP/OBS DSCHRG MGMT >30: CPT | Mod: ,,, | Performed by: HOSPITALIST

## 2021-10-25 PROCEDURE — 85060 PATHOLOGIST REVIEW: ICD-10-PCS | Mod: ,,, | Performed by: PATHOLOGY

## 2021-10-25 PROCEDURE — 85007 BL SMEAR W/DIFF WBC COUNT: CPT | Performed by: HOSPITALIST

## 2021-10-25 PROCEDURE — 99239 PR HOSPITAL DISCHARGE DAY,>30 MIN: ICD-10-PCS | Mod: ,,, | Performed by: HOSPITALIST

## 2021-10-25 PROCEDURE — 25000003 PHARM REV CODE 250: Performed by: HOSPITALIST

## 2021-10-25 PROCEDURE — 84100 ASSAY OF PHOSPHORUS: CPT | Performed by: HOSPITALIST

## 2021-10-25 PROCEDURE — 25000003 PHARM REV CODE 250: Performed by: INTERNAL MEDICINE

## 2021-10-25 PROCEDURE — 80048 BASIC METABOLIC PNL TOTAL CA: CPT | Performed by: HOSPITALIST

## 2021-10-25 PROCEDURE — 63600175 PHARM REV CODE 636 W HCPCS: Performed by: INTERNAL MEDICINE

## 2021-10-25 RX ORDER — LOPERAMIDE HYDROCHLORIDE 2 MG/1
2 CAPSULE ORAL 4 TIMES DAILY PRN
Qty: 30 CAPSULE | Refills: 0 | Status: SHIPPED | OUTPATIENT
Start: 2021-10-25 | End: 2021-11-04

## 2021-10-25 RX ORDER — CIPROFLOXACIN 500 MG/1
500 TABLET ORAL EVERY 12 HOURS
Qty: 14 TABLET | Refills: 0 | Status: SHIPPED | OUTPATIENT
Start: 2021-10-25 | End: 2021-11-01

## 2021-10-25 RX ORDER — METRONIDAZOLE 500 MG/1
500 TABLET ORAL EVERY 8 HOURS
Qty: 21 TABLET | Refills: 0 | Status: SHIPPED | OUTPATIENT
Start: 2021-10-25 | End: 2021-11-01

## 2021-10-25 RX ORDER — SODIUM,POTASSIUM PHOSPHATES 280-250MG
2 POWDER IN PACKET (EA) ORAL
Status: DISCONTINUED | OUTPATIENT
Start: 2021-10-25 | End: 2021-10-25 | Stop reason: HOSPADM

## 2021-10-25 RX ORDER — ACETAMINOPHEN 500 MG
1000 TABLET ORAL EVERY 8 HOURS PRN
Refills: 0 | COMMUNITY
Start: 2021-10-25

## 2021-10-25 RX ORDER — LOPERAMIDE HYDROCHLORIDE 2 MG/1
2 CAPSULE ORAL ONCE
Status: COMPLETED | OUTPATIENT
Start: 2021-10-25 | End: 2021-10-25

## 2021-10-25 RX ORDER — LOPERAMIDE HYDROCHLORIDE 2 MG/1
2 CAPSULE ORAL 4 TIMES DAILY PRN
Status: DISCONTINUED | OUTPATIENT
Start: 2021-10-25 | End: 2021-10-25 | Stop reason: HOSPADM

## 2021-10-25 RX ADMIN — POTASSIUM & SODIUM PHOSPHATES POWDER PACK 280-160-250 MG 2 PACKET: 280-160-250 PACK at 12:10

## 2021-10-25 RX ADMIN — PIPERACILLIN SODIUM AND TAZOBACTAM SODIUM 4.5 G: 4; .5 INJECTION, POWDER, FOR SOLUTION INTRAVENOUS at 04:10

## 2021-10-25 RX ADMIN — PIPERACILLIN SODIUM AND TAZOBACTAM SODIUM 4.5 G: 4; .5 INJECTION, POWDER, FOR SOLUTION INTRAVENOUS at 12:10

## 2021-10-25 RX ADMIN — LOPERAMIDE HYDROCHLORIDE 2 MG: 2 CAPSULE ORAL at 07:10

## 2021-10-25 RX ADMIN — SODIUM CHLORIDE, SODIUM LACTATE, POTASSIUM CHLORIDE, AND CALCIUM CHLORIDE: .6; .31; .03; .02 INJECTION, SOLUTION INTRAVENOUS at 04:10

## 2021-10-25 RX ADMIN — POTASSIUM & SODIUM PHOSPHATES POWDER PACK 280-160-250 MG 2 PACKET: 280-160-250 PACK at 07:10

## 2021-10-27 LAB
FINAL PATHOLOGIC DIAGNOSIS: ABNORMAL
GROSS: ABNORMAL
Lab: ABNORMAL
SUPPLEMENTAL DIAGNOSIS: ABNORMAL

## 2021-10-29 LAB — CALPROTECTIN STL-MCNT: 191.6 MCG/G
